# Patient Record
Sex: MALE | URBAN - METROPOLITAN AREA
[De-identification: names, ages, dates, MRNs, and addresses within clinical notes are randomized per-mention and may not be internally consistent; named-entity substitution may affect disease eponyms.]

---

## 2017-03-01 ENCOUNTER — APPOINTMENT (RX ONLY)
Dept: URBAN - METROPOLITAN AREA CLINIC 121 | Facility: CLINIC | Age: 62
Setting detail: DERMATOLOGY
End: 2017-03-01

## 2017-03-01 DIAGNOSIS — L30.9 DERMATITIS, UNSPECIFIED: ICD-10-CM

## 2017-03-01 DIAGNOSIS — B36.0 PITYRIASIS VERSICOLOR: ICD-10-CM

## 2017-03-01 PROBLEM — E03.9 HYPOTHYROIDISM, UNSPECIFIED: Status: ACTIVE | Noted: 2017-03-01

## 2017-03-01 PROBLEM — I63.50 CEREBRAL INFARCTION DUE TO UNSPECIFIED OCCLUSION OR STENOSIS OF UNSPECIFIED CEREBRAL ARTERY: Status: ACTIVE | Noted: 2017-03-01

## 2017-03-01 PROBLEM — M12.9 ARTHROPATHY, UNSPECIFIED: Status: ACTIVE | Noted: 2017-03-01

## 2017-03-01 PROBLEM — H91.90 UNSPECIFIED HEARING LOSS, UNSPECIFIED EAR: Status: ACTIVE | Noted: 2017-03-01

## 2017-03-01 PROBLEM — E78.5 HYPERLIPIDEMIA, UNSPECIFIED: Status: ACTIVE | Noted: 2017-03-01

## 2017-03-01 PROBLEM — K21.9 GASTRO-ESOPHAGEAL REFLUX DISEASE WITHOUT ESOPHAGITIS: Status: ACTIVE | Noted: 2017-03-01

## 2017-03-01 PROBLEM — L23.7 ALLERGIC CONTACT DERMATITIS DUE TO PLANTS, EXCEPT FOOD: Status: ACTIVE | Noted: 2017-03-01

## 2017-03-01 PROBLEM — I10 ESSENTIAL (PRIMARY) HYPERTENSION: Status: ACTIVE | Noted: 2017-03-01

## 2017-03-01 PROBLEM — F41.9 ANXIETY DISORDER, UNSPECIFIED: Status: ACTIVE | Noted: 2017-03-01

## 2017-03-01 PROBLEM — J30.1 ALLERGIC RHINITIS DUE TO POLLEN: Status: ACTIVE | Noted: 2017-03-01

## 2017-03-01 PROBLEM — I25.10 ATHEROSCLEROTIC HEART DISEASE OF NATIVE CORONARY ARTERY WITHOUT ANGINA PECTORIS: Status: ACTIVE | Noted: 2017-03-01

## 2017-03-01 PROCEDURE — ? PRESCRIPTION

## 2017-03-01 PROCEDURE — ? BIOPSY BY SHAVE METHOD

## 2017-03-01 PROCEDURE — ? COUNSELING

## 2017-03-01 PROCEDURE — 99202 OFFICE O/P NEW SF 15 MIN: CPT | Mod: 25

## 2017-03-01 PROCEDURE — 11100: CPT

## 2017-03-01 RX ORDER — ECONAZOLE NITRATE 10 MG/G
CREAM TOPICAL
Qty: 1 | Refills: 2 | Status: ERX | COMMUNITY
Start: 2017-03-01

## 2017-03-01 RX ADMIN — ECONAZOLE NITRATE: 10 CREAM TOPICAL at 17:18

## 2017-03-01 ASSESSMENT — PAIN INTENSITY VAS: HOW INTENSE IS YOUR PAIN 0 BEING NO PAIN, 10 BEING THE MOST SEVERE PAIN POSSIBLE?: NO PAIN

## 2017-03-01 ASSESSMENT — LOCATION ZONE DERM
LOCATION ZONE: ARM
LOCATION ZONE: TRUNK

## 2017-03-01 ASSESSMENT — BSA RASH: BSA RASH: 20

## 2017-03-01 ASSESSMENT — LOCATION SIMPLE DESCRIPTION DERM
LOCATION SIMPLE: CHEST
LOCATION SIMPLE: RIGHT UPPER BACK
LOCATION SIMPLE: RIGHT UPPER ARM

## 2017-03-01 ASSESSMENT — LOCATION DETAILED DESCRIPTION DERM
LOCATION DETAILED: RIGHT SUPERIOR MEDIAL UPPER BACK
LOCATION DETAILED: LEFT MEDIAL SUPERIOR CHEST
LOCATION DETAILED: RIGHT DISTAL POSTERIOR UPPER ARM

## 2017-03-01 ASSESSMENT — SEVERITY ASSESSMENT: SEVERITY: MILD

## 2017-03-01 NOTE — PROCEDURE: BIOPSY BY SHAVE METHOD
Additional Anesthesia Volume In Cc (Will Not Render If 0): 0
Electrodesiccation And Curettage Text: The wound bed was treated with electrodesiccation and curettage after the biopsy was performed.
Render Post-Care Instructions In Note?: yes
Biopsy Method: Personna blade
Silver Nitrate Text: The wound bed was treated with silver nitrate after the biopsy was performed.
Post-Care Instructions: -I reviewed with the patient in detail post-care instructions. Patient is to keep the pressure dressing dry and in place for 24 hours. Upon removing the bandage, the patient is to begin washing with soap and water and applying Vaseline, bacitracin, or polysporin (NOT neosporin) once to twice a day for 7-10 days or until healed. \\n-Bleeding is rare, but if it should occur apply direct pressure to the bleeding site for a full 15 minutes without easing up. If the bleeding continues despite your efforts, please call the office in which you were seen to speak with a provider. It may be necessary to go to the emergency room. \\n-your biopsy will be submitted to the appropriate lab for analysis and you should receive results within 7-10 business days. If the result is benign you may receive a card in the mail, otherwise we will contact you. \\n-if you have not heard from our office within 3 weeks either by phone or mail, please contact the office.
Cryotherapy Text: The wound bed was treated with cryotherapy after the biopsy was performed.
Hemostasis: Steven's
Biopsy Type: H and E
Electrodesiccation Text: The wound bed was treated with electrodesiccation after the biopsy was performed.
Lab: [facility] [address] [facility]
Lab Facility: [address]
Notification Instructions: Patient will be notified of biopsy results. However, patient instructed to call the office if not contacted within 2 weeks.
Destruction After The Procedure: No
Consent: Written consent was obtained and risks were reviewed including but not limited to scarring, infection, bleeding, scabbing, incomplete removal, nerve damage and allergy to anesthesia.
Anesthesia Type: 1% lidocaine with epinephrine and a 1:10 solution of 8.4% sodium bicarbonate
Detail Level: Detailed
Curettage Text: The wound bed was treated with curettage after the biopsy was performed.
Type Of Destruction Used: Curettage
Dressing: bandage
Billing Type: [employer]
Size Of Lesion In Cm: 0.7
Wound Care: Vaseline
Anesthesia Volume In Cc (Will Not Render If 0): 0.3

## 2022-07-09 ENCOUNTER — TELEPHONE ENCOUNTER (OUTPATIENT)
Dept: URBAN - METROPOLITAN AREA CLINIC 121 | Facility: CLINIC | Age: 67
End: 2022-07-09

## 2022-07-10 ENCOUNTER — TELEPHONE ENCOUNTER (OUTPATIENT)
Dept: URBAN - METROPOLITAN AREA CLINIC 121 | Facility: CLINIC | Age: 67
End: 2022-07-10

## 2024-02-07 ENCOUNTER — HOSPITAL ENCOUNTER (INPATIENT)
Facility: HOSPITAL | Age: 69
LOS: 4 days | Discharge: NURSING, REHAB OR LONG-TERM CARE | DRG: 689 | End: 2024-02-13
Attending: EMERGENCY MEDICINE | Admitting: INTERNAL MEDICINE
Payer: MEDICARE

## 2024-02-07 ENCOUNTER — APPOINTMENT (OUTPATIENT)
Dept: CARDIOLOGY | Facility: HOSPITAL | Age: 69
DRG: 689 | End: 2024-02-07
Payer: MEDICARE

## 2024-02-07 ENCOUNTER — APPOINTMENT (OUTPATIENT)
Dept: RADIOLOGY | Facility: HOSPITAL | Age: 69
DRG: 689 | End: 2024-02-07
Payer: MEDICARE

## 2024-02-07 DIAGNOSIS — N30.00 ACUTE CYSTITIS WITHOUT HEMATURIA: ICD-10-CM

## 2024-02-07 DIAGNOSIS — I10 HYPERTENSION, UNSPECIFIED TYPE: ICD-10-CM

## 2024-02-07 DIAGNOSIS — N30.01 ACUTE CYSTITIS WITH HEMATURIA: Primary | ICD-10-CM

## 2024-02-07 PROBLEM — N39.0 UTI (URINARY TRACT INFECTION): Status: ACTIVE | Noted: 2024-02-07

## 2024-02-07 LAB
ALBUMIN SERPL BCP-MCNC: 4.7 G/DL (ref 3.4–5)
ALP SERPL-CCNC: 50 U/L (ref 33–136)
ALT SERPL W P-5'-P-CCNC: 13 U/L (ref 10–52)
ANION GAP SERPL CALC-SCNC: 15 MMOL/L (ref 10–20)
APPEARANCE UR: CLEAR
AST SERPL W P-5'-P-CCNC: 31 U/L (ref 9–39)
BACTERIA #/AREA URNS AUTO: ABNORMAL /HPF
BASOPHILS # BLD AUTO: 0.05 X10*3/UL (ref 0–0.1)
BASOPHILS NFR BLD AUTO: 0.3 %
BILIRUB SERPL-MCNC: 1.4 MG/DL (ref 0–1.2)
BILIRUB UR STRIP.AUTO-MCNC: NEGATIVE MG/DL
BUN SERPL-MCNC: 15 MG/DL (ref 6–23)
CALCIUM SERPL-MCNC: 10.4 MG/DL (ref 8.6–10.3)
CARDIAC TROPONIN I PNL SERPL HS: 8 NG/L (ref 0–20)
CHLORIDE SERPL-SCNC: 101 MMOL/L (ref 98–107)
CO2 SERPL-SCNC: 25 MMOL/L (ref 21–32)
COLOR UR: YELLOW
CREAT SERPL-MCNC: 1.31 MG/DL (ref 0.5–1.3)
EGFRCR SERPLBLD CKD-EPI 2021: 59 ML/MIN/1.73M*2
EOSINOPHIL # BLD AUTO: 0.06 X10*3/UL (ref 0–0.7)
EOSINOPHIL NFR BLD AUTO: 0.4 %
ERYTHROCYTE [DISTWIDTH] IN BLOOD BY AUTOMATED COUNT: 12.9 % (ref 11.5–14.5)
FLUAV RNA RESP QL NAA+PROBE: NOT DETECTED
FLUBV RNA RESP QL NAA+PROBE: NOT DETECTED
GLUCOSE SERPL-MCNC: 108 MG/DL (ref 74–99)
GLUCOSE UR STRIP.AUTO-MCNC: NEGATIVE MG/DL
HCT VFR BLD AUTO: 47.3 % (ref 41–52)
HGB BLD-MCNC: 15.6 G/DL (ref 13.5–17.5)
HOLD SPECIMEN: NORMAL
IMM GRANULOCYTES # BLD AUTO: 0.12 X10*3/UL (ref 0–0.7)
IMM GRANULOCYTES NFR BLD AUTO: 0.7 % (ref 0–0.9)
KETONES UR STRIP.AUTO-MCNC: NEGATIVE MG/DL
LACTATE SERPL-SCNC: 1.4 MMOL/L (ref 0.4–2)
LEUKOCYTE ESTERASE UR QL STRIP.AUTO: ABNORMAL
LYMPHOCYTES # BLD AUTO: 0.93 X10*3/UL (ref 1.2–4.8)
LYMPHOCYTES NFR BLD AUTO: 5.7 %
MAGNESIUM SERPL-MCNC: 1.95 MG/DL (ref 1.6–2.4)
MCH RBC QN AUTO: 33.1 PG (ref 26–34)
MCHC RBC AUTO-ENTMCNC: 33 G/DL (ref 32–36)
MCV RBC AUTO: 100 FL (ref 80–100)
MONOCYTES # BLD AUTO: 0.71 X10*3/UL (ref 0.1–1)
MONOCYTES NFR BLD AUTO: 4.4 %
NEUTROPHILS # BLD AUTO: 14.32 X10*3/UL (ref 1.2–7.7)
NEUTROPHILS NFR BLD AUTO: 88.5 %
NITRITE UR QL STRIP.AUTO: NEGATIVE
NRBC BLD-RTO: 0 /100 WBCS (ref 0–0)
PH UR STRIP.AUTO: 7 [PH]
PLATELET # BLD AUTO: 115 X10*3/UL (ref 150–450)
POTASSIUM SERPL-SCNC: 4.9 MMOL/L (ref 3.5–5.3)
POTASSIUM SERPL-SCNC: 5.5 MMOL/L (ref 3.5–5.3)
PROT SERPL-MCNC: 7.1 G/DL (ref 6.4–8.2)
PROT UR STRIP.AUTO-MCNC: ABNORMAL MG/DL
RBC # BLD AUTO: 4.72 X10*6/UL (ref 4.5–5.9)
RBC # UR STRIP.AUTO: ABNORMAL /UL
RBC #/AREA URNS AUTO: ABNORMAL /HPF
SARS-COV-2 RNA RESP QL NAA+PROBE: NOT DETECTED
SODIUM SERPL-SCNC: 135 MMOL/L (ref 136–145)
SP GR UR STRIP.AUTO: 1.01
UROBILINOGEN UR STRIP.AUTO-MCNC: 2 MG/DL
WBC # BLD AUTO: 16.2 X10*3/UL (ref 4.4–11.3)
WBC #/AREA URNS AUTO: ABNORMAL /HPF
WBC CLUMPS #/AREA URNS AUTO: ABNORMAL /HPF

## 2024-02-07 PROCEDURE — G0378 HOSPITAL OBSERVATION PER HR: HCPCS

## 2024-02-07 PROCEDURE — 84132 ASSAY OF SERUM POTASSIUM: CPT | Performed by: PHYSICIAN ASSISTANT

## 2024-02-07 PROCEDURE — 84484 ASSAY OF TROPONIN QUANT: CPT | Performed by: PHYSICIAN ASSISTANT

## 2024-02-07 PROCEDURE — 36415 COLL VENOUS BLD VENIPUNCTURE: CPT | Performed by: PHYSICIAN ASSISTANT

## 2024-02-07 PROCEDURE — 97161 PT EVAL LOW COMPLEX 20 MIN: CPT | Mod: GP

## 2024-02-07 PROCEDURE — 83605 ASSAY OF LACTIC ACID: CPT | Performed by: PHYSICIAN ASSISTANT

## 2024-02-07 PROCEDURE — 2500000004 HC RX 250 GENERAL PHARMACY W/ HCPCS (ALT 636 FOR OP/ED): Performed by: NURSE PRACTITIONER

## 2024-02-07 PROCEDURE — 2500000004 HC RX 250 GENERAL PHARMACY W/ HCPCS (ALT 636 FOR OP/ED): Performed by: PHYSICIAN ASSISTANT

## 2024-02-07 PROCEDURE — 87636 SARSCOV2 & INF A&B AMP PRB: CPT | Performed by: PHYSICIAN ASSISTANT

## 2024-02-07 PROCEDURE — 71046 X-RAY EXAM CHEST 2 VIEWS: CPT | Mod: FR

## 2024-02-07 PROCEDURE — 83735 ASSAY OF MAGNESIUM: CPT | Performed by: PHYSICIAN ASSISTANT

## 2024-02-07 PROCEDURE — 99285 EMERGENCY DEPT VISIT HI MDM: CPT | Mod: 25 | Performed by: EMERGENCY MEDICINE

## 2024-02-07 PROCEDURE — 93005 ELECTROCARDIOGRAM TRACING: CPT

## 2024-02-07 PROCEDURE — 80053 COMPREHEN METABOLIC PANEL: CPT | Performed by: PHYSICIAN ASSISTANT

## 2024-02-07 PROCEDURE — 85025 COMPLETE CBC W/AUTO DIFF WBC: CPT | Performed by: PHYSICIAN ASSISTANT

## 2024-02-07 PROCEDURE — 71046 X-RAY EXAM CHEST 2 VIEWS: CPT | Mod: FOREIGN READ | Performed by: RADIOLOGY

## 2024-02-07 PROCEDURE — 87086 URINE CULTURE/COLONY COUNT: CPT | Mod: PARLAB | Performed by: PHYSICIAN ASSISTANT

## 2024-02-07 PROCEDURE — 2500000001 HC RX 250 WO HCPCS SELF ADMINISTERED DRUGS (ALT 637 FOR MEDICARE OP): Performed by: NURSE PRACTITIONER

## 2024-02-07 PROCEDURE — 81001 URINALYSIS AUTO W/SCOPE: CPT | Performed by: PHYSICIAN ASSISTANT

## 2024-02-07 RX ORDER — SENNOSIDES 8.6 MG/1
2 TABLET ORAL 2 TIMES DAILY
Status: DISCONTINUED | OUTPATIENT
Start: 2024-02-07 | End: 2024-02-13 | Stop reason: HOSPADM

## 2024-02-07 RX ORDER — CITALOPRAM 40 MG/1
40 TABLET, FILM COATED ORAL
COMMUNITY
Start: 2022-12-05

## 2024-02-07 RX ORDER — TAMSULOSIN HYDROCHLORIDE 0.4 MG/1
0.4 CAPSULE ORAL 2 TIMES DAILY
COMMUNITY
Start: 2023-11-09

## 2024-02-07 RX ORDER — FLUTICASONE PROPIONATE 50 MCG
2 SPRAY, SUSPENSION (ML) NASAL DAILY PRN
COMMUNITY
Start: 2018-04-02

## 2024-02-07 RX ORDER — PANTOPRAZOLE SODIUM 40 MG/1
40 TABLET, DELAYED RELEASE ORAL
Status: DISCONTINUED | OUTPATIENT
Start: 2024-02-07 | End: 2024-02-13 | Stop reason: HOSPADM

## 2024-02-07 RX ORDER — FAMOTIDINE 20 MG/1
20 TABLET, FILM COATED ORAL 2 TIMES DAILY
COMMUNITY
Start: 2021-07-08

## 2024-02-07 RX ORDER — SODIUM CHLORIDE 9 MG/ML
75 INJECTION, SOLUTION INTRAVENOUS CONTINUOUS
Status: ACTIVE | OUTPATIENT
Start: 2024-02-07 | End: 2024-02-07

## 2024-02-07 RX ORDER — ACETAMINOPHEN 325 MG/1
650 TABLET ORAL EVERY 6 HOURS PRN
Status: DISCONTINUED | OUTPATIENT
Start: 2024-02-07 | End: 2024-02-13 | Stop reason: HOSPADM

## 2024-02-07 RX ORDER — PANTOPRAZOLE SODIUM 40 MG/1
40 TABLET, DELAYED RELEASE ORAL
COMMUNITY
Start: 2023-05-10

## 2024-02-07 RX ORDER — CLOPIDOGREL BISULFATE 75 MG/1
75 TABLET ORAL DAILY
Status: DISCONTINUED | OUTPATIENT
Start: 2024-02-07 | End: 2024-02-13 | Stop reason: HOSPADM

## 2024-02-07 RX ORDER — LEVOTHYROXINE SODIUM 175 UG/1
175 TABLET ORAL
COMMUNITY
Start: 2023-10-16

## 2024-02-07 RX ORDER — ROSUVASTATIN CALCIUM 10 MG/1
20 TABLET, COATED ORAL DAILY
Status: DISCONTINUED | OUTPATIENT
Start: 2024-02-07 | End: 2024-02-13 | Stop reason: HOSPADM

## 2024-02-07 RX ORDER — LOSARTAN POTASSIUM 25 MG/1
1 TABLET ORAL NIGHTLY
COMMUNITY
Start: 2023-05-30 | End: 2024-02-13 | Stop reason: HOSPADM

## 2024-02-07 RX ORDER — CLOPIDOGREL BISULFATE 75 MG/1
75 TABLET ORAL
COMMUNITY
Start: 2023-09-01

## 2024-02-07 RX ORDER — CITALOPRAM 20 MG/1
40 TABLET, FILM COATED ORAL
Status: DISCONTINUED | OUTPATIENT
Start: 2024-02-07 | End: 2024-02-13 | Stop reason: HOSPADM

## 2024-02-07 RX ORDER — METOPROLOL SUCCINATE 25 MG/1
0.5 TABLET, EXTENDED RELEASE ORAL DAILY
COMMUNITY
Start: 2023-05-30

## 2024-02-07 RX ORDER — CEFTRIAXONE 1 G/50ML
1 INJECTION, SOLUTION INTRAVENOUS EVERY 24 HOURS
Status: DISCONTINUED | OUTPATIENT
Start: 2024-02-07 | End: 2024-02-09

## 2024-02-07 RX ORDER — NITROGLYCERIN 0.4 MG/1
0.4 TABLET SUBLINGUAL EVERY 5 MIN PRN
COMMUNITY
Start: 2022-10-14

## 2024-02-07 RX ORDER — ROSUVASTATIN CALCIUM 20 MG/1
20 TABLET, COATED ORAL
COMMUNITY
Start: 2023-11-08

## 2024-02-07 RX ORDER — METOPROLOL SUCCINATE 25 MG/1
12.5 TABLET, EXTENDED RELEASE ORAL DAILY
Status: DISCONTINUED | OUTPATIENT
Start: 2024-02-07 | End: 2024-02-13 | Stop reason: HOSPADM

## 2024-02-07 RX ORDER — FAMOTIDINE 20 MG/1
20 TABLET, FILM COATED ORAL 2 TIMES DAILY
Status: DISCONTINUED | OUTPATIENT
Start: 2024-02-07 | End: 2024-02-13 | Stop reason: HOSPADM

## 2024-02-07 RX ORDER — ALLOPURINOL 100 MG/1
200 TABLET ORAL
COMMUNITY
Start: 2023-12-11

## 2024-02-07 RX ORDER — TAMSULOSIN HYDROCHLORIDE 0.4 MG/1
0.4 CAPSULE ORAL 2 TIMES DAILY
Status: DISCONTINUED | OUTPATIENT
Start: 2024-02-07 | End: 2024-02-13 | Stop reason: HOSPADM

## 2024-02-07 RX ORDER — ACETAMINOPHEN 325 MG/1
975 TABLET ORAL ONCE
Status: COMPLETED | OUTPATIENT
Start: 2024-02-07 | End: 2024-02-07

## 2024-02-07 RX ORDER — CEFTRIAXONE 1 G/50ML
1 INJECTION, SOLUTION INTRAVENOUS ONCE
Status: COMPLETED | OUTPATIENT
Start: 2024-02-07 | End: 2024-02-07

## 2024-02-07 RX ORDER — LEVOTHYROXINE SODIUM 175 UG/1
175 TABLET ORAL
Status: DISCONTINUED | OUTPATIENT
Start: 2024-02-08 | End: 2024-02-13 | Stop reason: HOSPADM

## 2024-02-07 RX ORDER — LOSARTAN POTASSIUM 25 MG/1
25 TABLET ORAL NIGHTLY
Status: DISCONTINUED | OUTPATIENT
Start: 2024-02-07 | End: 2024-02-11

## 2024-02-07 RX ORDER — HEPARIN SODIUM 5000 [USP'U]/ML
5000 INJECTION, SOLUTION INTRAVENOUS; SUBCUTANEOUS EVERY 8 HOURS
Status: DISCONTINUED | OUTPATIENT
Start: 2024-02-07 | End: 2024-02-13 | Stop reason: HOSPADM

## 2024-02-07 RX ADMIN — METOPROLOL SUCCINATE 12.5 MG: 25 TABLET, EXTENDED RELEASE ORAL at 16:40

## 2024-02-07 RX ADMIN — TAMSULOSIN HYDROCHLORIDE 0.4 MG: 0.4 CAPSULE ORAL at 16:40

## 2024-02-07 RX ADMIN — HEPARIN SODIUM 5000 UNITS: 5000 INJECTION INTRAVENOUS; SUBCUTANEOUS at 16:39

## 2024-02-07 RX ADMIN — ACETAMINOPHEN 650 MG: 325 TABLET ORAL at 14:17

## 2024-02-07 RX ADMIN — PANTOPRAZOLE SODIUM 40 MG: 40 TABLET, DELAYED RELEASE ORAL at 16:39

## 2024-02-07 RX ADMIN — CLOPIDOGREL BISULFATE 75 MG: 75 TABLET ORAL at 16:39

## 2024-02-07 RX ADMIN — CITALOPRAM HYDROBROMIDE 40 MG: 20 TABLET ORAL at 16:40

## 2024-02-07 RX ADMIN — SENNOSIDES 17.2 MG: 8.6 TABLET, FILM COATED ORAL at 16:39

## 2024-02-07 RX ADMIN — CEFTRIAXONE SODIUM 1 G: 1 INJECTION, SOLUTION INTRAVENOUS at 11:02

## 2024-02-07 RX ADMIN — ROSUVASTATIN CALCIUM 20 MG: 10 TABLET, FILM COATED ORAL at 16:40

## 2024-02-07 RX ADMIN — LOSARTAN POTASSIUM 25 MG: 25 TABLET, FILM COATED ORAL at 20:40

## 2024-02-07 RX ADMIN — ACETAMINOPHEN 975 MG: 325 TABLET ORAL at 08:39

## 2024-02-07 RX ADMIN — FAMOTIDINE 20 MG: 20 TABLET ORAL at 16:39

## 2024-02-07 RX ADMIN — SODIUM CHLORIDE 75 ML/HR: 9 INJECTION, SOLUTION INTRAVENOUS at 16:37

## 2024-02-07 SDOH — SOCIAL STABILITY: SOCIAL INSECURITY: WERE YOU ABLE TO COMPLETE ALL THE BEHAVIORAL HEALTH SCREENINGS?: YES

## 2024-02-07 SDOH — SOCIAL STABILITY: SOCIAL INSECURITY: DO YOU FEEL UNSAFE GOING BACK TO THE PLACE WHERE YOU ARE LIVING?: NO

## 2024-02-07 SDOH — SOCIAL STABILITY: SOCIAL INSECURITY: HAVE YOU HAD THOUGHTS OF HARMING ANYONE ELSE?: NO

## 2024-02-07 SDOH — SOCIAL STABILITY: SOCIAL INSECURITY: DOES ANYONE TRY TO KEEP YOU FROM HAVING/CONTACTING OTHER FRIENDS OR DOING THINGS OUTSIDE YOUR HOME?: NO

## 2024-02-07 SDOH — SOCIAL STABILITY: SOCIAL INSECURITY: DO YOU FEEL ANYONE HAS EXPLOITED OR TAKEN ADVANTAGE OF YOU FINANCIALLY OR OF YOUR PERSONAL PROPERTY?: NO

## 2024-02-07 SDOH — SOCIAL STABILITY: SOCIAL INSECURITY: ARE YOU OR HAVE YOU BEEN THREATENED OR ABUSED PHYSICALLY, EMOTIONALLY, OR SEXUALLY BY ANYONE?: NO

## 2024-02-07 SDOH — SOCIAL STABILITY: SOCIAL INSECURITY: ABUSE: ADULT

## 2024-02-07 SDOH — SOCIAL STABILITY: SOCIAL INSECURITY: HAS ANYONE EVER THREATENED TO HURT YOUR FAMILY OR YOUR PETS?: NO

## 2024-02-07 SDOH — SOCIAL STABILITY: SOCIAL INSECURITY: ARE THERE ANY APPARENT SIGNS OF INJURIES/BEHAVIORS THAT COULD BE RELATED TO ABUSE/NEGLECT?: NO

## 2024-02-07 ASSESSMENT — COGNITIVE AND FUNCTIONAL STATUS - GENERAL
TOILETING: A LITTLE
MOBILITY SCORE: 13
WALKING IN HOSPITAL ROOM: A LOT
TURNING FROM BACK TO SIDE WHILE IN FLAT BAD: A LITTLE
DRESSING REGULAR LOWER BODY CLOTHING: A LITTLE
WALKING IN HOSPITAL ROOM: A LOT
MOVING TO AND FROM BED TO CHAIR: A LOT
MOBILITY SCORE: 13
PATIENT BASELINE BEDBOUND: NO
DAILY ACTIVITIY SCORE: 21
MOVING TO AND FROM BED TO CHAIR: A LOT
DRESSING REGULAR LOWER BODY CLOTHING: A LITTLE
TURNING FROM BACK TO SIDE WHILE IN FLAT BAD: A LOT
STANDING UP FROM CHAIR USING ARMS: A LOT
HELP NEEDED FOR BATHING: A LITTLE
STANDING UP FROM CHAIR USING ARMS: A LITTLE
STANDING UP FROM CHAIR USING ARMS: A LOT
TURNING FROM BACK TO SIDE WHILE IN FLAT BAD: A LOT
MOVING TO AND FROM BED TO CHAIR: A LITTLE
TOILETING: A LITTLE
CLIMB 3 TO 5 STEPS WITH RAILING: TOTAL
DAILY ACTIVITIY SCORE: 21
WALKING IN HOSPITAL ROOM: A LOT
HELP NEEDED FOR BATHING: A LITTLE
CLIMB 3 TO 5 STEPS WITH RAILING: TOTAL

## 2024-02-07 ASSESSMENT — PAIN - FUNCTIONAL ASSESSMENT
PAIN_FUNCTIONAL_ASSESSMENT: 0-10
PAIN_FUNCTIONAL_ASSESSMENT: 0-10

## 2024-02-07 ASSESSMENT — ACTIVITIES OF DAILY LIVING (ADL)
HEARING - LEFT EAR: FUNCTIONAL
TOILETING: NEEDS ASSISTANCE
PATIENT'S MEMORY ADEQUATE TO SAFELY COMPLETE DAILY ACTIVITIES?: NO
GROOMING: INDEPENDENT
LACK_OF_TRANSPORTATION: NO
ADEQUATE_TO_COMPLETE_ADL: YES
WALKS IN HOME: NEEDS ASSISTANCE
DRESSING YOURSELF: INDEPENDENT
HEARING - RIGHT EAR: FUNCTIONAL
FEEDING YOURSELF: INDEPENDENT
JUDGMENT_ADEQUATE_SAFELY_COMPLETE_DAILY_ACTIVITIES: NO
BATHING: NEEDS ASSISTANCE

## 2024-02-07 ASSESSMENT — LIFESTYLE VARIABLES
HOW OFTEN DURING THE LAST YEAR HAVE YOU FOUND THAT YOU WERE NOT ABLE TO STOP DRINKING ONCE YOU HAD STARTED: NEVER
HOW OFTEN DURING THE LAST YEAR HAVE YOU BEEN UNABLE TO REMEMBER WHAT HAPPENED THE NIGHT BEFORE BECAUSE YOU HAD BEEN DRINKING: NEVER
PRESCIPTION_ABUSE_PAST_12_MONTHS: NO
HOW OFTEN DURING THE LAST YEAR HAVE YOU NEEDED AN ALCOHOLIC DRINK FIRST THING IN THE MORNING TO GET YOURSELF GOING AFTER A NIGHT OF HEAVY DRINKING: LESS THAN MONTHLY
AUDIT-C TOTAL SCORE: 7
HOW MANY STANDARD DRINKS CONTAINING ALCOHOL DO YOU HAVE ON A TYPICAL DAY: 3 OR 4
HOW OFTEN DURING THE LAST YEAR HAVE YOU HAD A FEELING OF GUILT OR REMORSE AFTER DRINKING: NEVER
HOW OFTEN DO YOU HAVE A DRINK CONTAINING ALCOHOL: 4 OR MORE TIMES A WEEK
HAS A RELATIVE, FRIEND, DOCTOR, OR ANOTHER HEALTH PROFESSIONAL EXPRESSED CONCERN ABOUT YOUR DRINKING OR SUGGESTED YOU CUT DOWN: YES, BUT NOT IN THE LAST YEAR
SKIP TO QUESTIONS 9-10: 0
HOW OFTEN DURING THE LAST YEAR HAVE YOU FAILED TO DO WHAT WAS NORMALLY EXPECTED FROM YOU BECAUSE OF DRINKING: NEVER
AUDIT TOTAL SCORE: 10
SUBSTANCE_ABUSE_PAST_12_MONTHS: NO
HOW OFTEN DO YOU HAVE 6 OR MORE DRINKS ON ONE OCCASION: MONTHLY
HAVE YOU OR SOMEONE ELSE BEEN INJURED AS A RESULT OF YOUR DRINKING: NO
AUDIT TOTAL SCORE: 3
AUDIT-C TOTAL SCORE: 7

## 2024-02-07 ASSESSMENT — COLUMBIA-SUICIDE SEVERITY RATING SCALE - C-SSRS
1. IN THE PAST MONTH, HAVE YOU WISHED YOU WERE DEAD OR WISHED YOU COULD GO TO SLEEP AND NOT WAKE UP?: NO
6. HAVE YOU EVER DONE ANYTHING, STARTED TO DO ANYTHING, OR PREPARED TO DO ANYTHING TO END YOUR LIFE?: NO
6. HAVE YOU EVER DONE ANYTHING, STARTED TO DO ANYTHING, OR PREPARED TO DO ANYTHING TO END YOUR LIFE?: NO
2. HAVE YOU ACTUALLY HAD ANY THOUGHTS OF KILLING YOURSELF?: NO
1. IN THE PAST MONTH, HAVE YOU WISHED YOU WERE DEAD OR WISHED YOU COULD GO TO SLEEP AND NOT WAKE UP?: NO
2. HAVE YOU ACTUALLY HAD ANY THOUGHTS OF KILLING YOURSELF?: NO

## 2024-02-07 ASSESSMENT — PATIENT HEALTH QUESTIONNAIRE - PHQ9
2. FEELING DOWN, DEPRESSED OR HOPELESS: NOT AT ALL
1. LITTLE INTEREST OR PLEASURE IN DOING THINGS: NOT AT ALL
SUM OF ALL RESPONSES TO PHQ9 QUESTIONS 1 & 2: 0

## 2024-02-07 ASSESSMENT — PAIN SCALES - GENERAL
PAINLEVEL_OUTOF10: 0 - NO PAIN

## 2024-02-07 NOTE — ED PROVIDER NOTES
Limitations to History: confusion  External Records Reviewed  Independent Historians: nursing staff, patient's daughter  Social determinants affecting care: none    HPI  Aldo Rayo is a 68 y.o. male who presents emergency department for assessment of increased confusion and urinary frequency.  He is alert and oriented x 2 which makes him a poor historian.  He currently has no complaints.  Nursing staff reports that he is visiting from [place].  His daughter noticed that he was more confused and had some urinary frequency.  He has a history of UTIs per EMS.  PMH  Past Medical History:   Diagnosis Date    Anxiety     BPH (benign prostatic hyperplasia)     CAD (coronary artery disease)     CKD (chronic kidney disease)     Erectile dysfunction     GI bleed     Gout     HTN (hypertension)     Hypothyroidism     Stroke (cerebrum) (CMS/[facility])     reviewed by myself.    Meds  No current outpatient medications    Allergies  Allergies   Allergen Reactions    Aspirin GI bleeding    Cat Dander Itching and Swelling    Onion Headache     Headaches    Penicillins Rash    reviewed by myself.    SHx  Social History     Tobacco Use    Smoking status: Former     Types: Cigarettes, Pipe, Cigars     Quit date:      Years since quittin.1    Smokeless tobacco: Never   Substance Use Topics    Alcohol use: Yes     Comment: occasional    reviewed by myself.      ------------------------------------------------------------------------------------------------------------------------------------------    BP (!) 179/96 (BP Location: Left arm, Patient Position: Sitting)   Pulse 71   Temp (!) 38.2 °C (100.8 °F) (Temporal)   Resp 16   Wt 72.6 kg (160 lb)   SpO2 96%     Physical Exam  Vitals and nursing note reviewed.   Constitutional:       General: He is not in acute distress.     Appearance: Normal appearance. He is normal weight. He is not ill-appearing or toxic-appearing.   HENT:      Head: Normocephalic.      Nose: Nose  normal.      Mouth/Throat:      Mouth: Mucous membranes are moist.   Eyes:      Extraocular Movements: Extraocular movements intact.      Conjunctiva/sclera: Conjunctivae normal.   Cardiovascular:      Rate and Rhythm: Normal rate and regular rhythm.   Pulmonary:      Effort: Pulmonary effort is normal.      Breath sounds: Normal breath sounds.   Abdominal:      General: Abdomen is flat.      Palpations: Abdomen is soft.      Tenderness: There is no abdominal tenderness.   Musculoskeletal:         General: Normal range of motion.      Cervical back: Neck supple.   Skin:     General: Skin is warm and dry.   Neurological:      General: No focal deficit present.      Mental Status: He is alert.      Cranial Nerves: Cranial nerves 2-12 are intact.      Sensory: Sensation is intact.      Motor: Motor function is intact.      Comments: Oriented x 2   Psychiatric:         Attention and Perception: Attention normal.         Mood and Affect: Mood normal.          ------------------------------------------------------------------------------------------------------------------------------------------  Imaging  XR chest 2 views   Final Result   No regions of airspace consolidation.   Prior cardiac surgery and cardiomegaly.   Signed by Natalio Rueda MD           Labs  Labs Reviewed   CBC WITH AUTO DIFFERENTIAL - Abnormal       Result Value    WBC 16.2 (*)     nRBC 0.0      RBC 4.72      Hemoglobin 15.6      Hematocrit 47.3            MCH 33.1      MCHC 33.0      RDW 12.9      Platelets 115 (*)     Neutrophils % 88.5      Immature Granulocytes %, Automated 0.7      Lymphocytes % 5.7      Monocytes % 4.4      Eosinophils % 0.4      Basophils % 0.3      Neutrophils Absolute 14.32 (*)     Immature Granulocytes Absolute, Automated 0.12      Lymphocytes Absolute 0.93 (*)     Monocytes Absolute 0.71      Eosinophils Absolute 0.06      Basophils Absolute 0.05     COMPREHENSIVE METABOLIC PANEL - Abnormal    Glucose 108 (*)      Sodium 135 (*)     Potassium 5.5 (*)     Chloride 101      Bicarbonate 25      Anion Gap 15      Urea Nitrogen 15      Creatinine 1.31 (*)     eGFR 59 (*)     Calcium 10.4 (*)     Albumin 4.7      Alkaline Phosphatase 50      Total Protein 7.1      AST 31      Bilirubin, Total 1.4 (*)     ALT 13     URINALYSIS WITH REFLEX CULTURE AND MICROSCOPIC - Abnormal    Color, Urine Yellow      Appearance, Urine Clear      Specific Gravity, Urine 1.015      pH, Urine 7.0      Protein, Urine 30 (1+) (*)     Glucose, Urine NEGATIVE      Blood, Urine SMALL (1+) (*)     Ketones, Urine NEGATIVE      Bilirubin, Urine NEGATIVE      Urobilinogen, Urine 2.0 (*)     Nitrite, Urine NEGATIVE      Leukocyte Esterase, Urine MODERATE (2+) (*)    MICROSCOPIC ONLY, URINE - Abnormal    WBC, Urine 21-50 (*)     WBC Clumps, Urine RARE      RBC, Urine 6-10 (*)     Bacteria, Urine 2+ (*)    MAGNESIUM - Normal    Magnesium 1.95     LACTATE - Normal    Lactate 1.4      Narrative:     Venipuncture immediately after or during the administration of Metamizole may lead to falsely low results. Testing should be performed immediately  prior to Metamizole dosing.   SARS-COV-2 AND INFLUENZA A/B PCR - Normal    Flu A Result Not Detected      Flu B Result Not Detected      Coronavirus 2019, PCR Not Detected      Narrative:     This assay has received FDA Emergency Use Authorization (EUA) and  is only authorized for the duration of time that circumstances exist to justify the authorization of the emergency use of in vitro diagnostic tests for the detection of SARS-CoV-2 virus and/or diagnosis of COVID-19 infection under section 564(b)(1) of the Act, 21 U.S.C. 360bbb-3(b)(1). Testing for SARS-CoV-2 is only recommended for patients who meet current clinical and/or epidemiological criteria as defined by federal, state, or local public health directives. This assay is an in vitro diagnostic nucleic acid amplification test for the qualitative detection of  SARS-CoV-2, Influenza A, and Influenza B from nasopharyngeal specimens and has been validated for use at [facility]. Negative results do not preclude COVID-19 infections or Influenza A/B infections, and should not be used as the sole basis for diagnosis, treatment, or other management decisions. If Influenza A/B and RSV PCR results are negative, testing for Parainfluenza virus, Adenovirus and Metapneumovirus is routinely performed for [facility] [place] [place] pediatric oncology and intensive care inpatients, and is available on other patients by placing an add-on request.    TROPONIN I, HIGH SENSITIVITY - Normal    Troponin I, High Sensitivity 8      Narrative:     Less than 99th percentile of normal range cutoff-  Female and children under 18 years old <14 ng/L; Male <21 ng/L: Negative  Repeat testing should be performed if clinically indicated.     Female and children under 18 years old 14-50 ng/L; Male 21-50 ng/L:  Consistent with possible cardiac damage and possible increased clinical   risk. Serial measurements may help to assess extent of myocardial damage.     >50 ng/L: Consistent with cardiac damage, increased clinical risk and  myocardial infarction. Serial measurements may help assess extent of   myocardial damage.      NOTE: Children less than 1 year old may have higher baseline troponin   levels and results should be interpreted in conjunction with the overall   clinical context.     NOTE: Troponin I testing is performed using a different   testing methodology at [facility] than at other   [facility]. Direct result comparisons should only   be made within the same method.   POTASSIUM - Normal    Potassium 4.9     URINE CULTURE   URINALYSIS WITH REFLEX CULTURE AND MICROSCOPIC    Narrative:     The following orders were created for panel order Urinalysis with Reflex Culture and Microscopic.  Procedure                               Abnormality         Status                      ---------                               -----------         ------                     Urinalysis with Reflex C...[[id number]]  Abnormal            Final result               Extra Urine Gray Tube[[id number]]                            In process                   Please view results for these tests on the individual orders.   EXTRA URINE GRAY TUBE        ED Course  Diagnoses as of 02/07/24 1218   Acute cystitis with hematuria        Medical Decision Making: He did not appear ill or toxic.  Vital signs reviewed.  He is febrile.  He is hypertensive.  He is otherwise hemodynamically stable.  Comprehensive workup was initiated.    Differential diagnoses considered: COVID, influenza, pneumonia, UTI, others    Medications given: Oral Tylenol    EKG interpreted by myself: Normal sinus rhythm.  Ventricular rate 77 bpm.  No acute ST elevations or depressions.    Patient's daughter and wife now at bedside at 0810.  The patient's wife reports that he is never had a UTI before.  This would be the first time.  Patient daughter reports that he was up about every 15 minutes to urinate last night.  This morning he had a hard time getting up from bed and walking to the bathroom.  He does have history of coronary artery disease, hypertension, hyperlipidemia, CVA.  Patient daughter reports that he felt warm this morning she was concerned about possible fever.    I reviewed the labs from today.  Leukocytosis at 16.2.  H&H stable.  Platelets 115.  Glucose 108.  Sodium 135.  Potassium 5.5 however hemolyzed.  BUN 15 with creatinine 1.31.  Urinalysis positive for UTI.  Lactic normal.  COVID influenza negative.  Troponin negative.  Urine culture is pending.  Repeat potassium ordered due to hyperkalemia with hemolysis.  Chest x-ray showing no acute cardiopulmonary process.  He was ordered IV Rocephin for the UTI.  Due to his weakness, I do recommend admission.  The patient and his wife were updated and agreeable to plan of care.  I  consulted general medicine on-call.  I spoke with Dr. Benites who will admit.  Case discussed and evaluated with ED attending, Dr. Gilbert who is agreeable to patient plan of care.    Repeat potassium is normal at 4.9.    Diagnosis: UTI, generalized weakness  Plan: Admit           Mynor Palm PA-C  02/07/24 6433

## 2024-02-07 NOTE — PROGRESS NOTES
Physical Therapy    Physical Therapy    Physical Therapy Evaluation    Patient Name: Aldo Rayo  MRN: [id number]  Today's Date: 2/7/2024   Time Calculation  Start Time: 1535  Stop Time: 1600  Time Calculation (min): 25 min    Assessment/Plan   PT Assessment  PT Assessment Results: Decreased strength, Decreased endurance, Impaired balance, Decreased mobility, Decreased coordination, Decreased cognition  End of Session Communication: Bedside nurse  End of Session Patient Position: Bed, 4 rail up, Alarm on  IP OR SWING BED PT PLAN  Inpatient or Swing Bed: Inpatient  PT Plan  Treatment/Interventions: Bed mobility, Transfer training, Gait training, Strengthening  PT Plan: Skilled PT  PT Frequency: 3 times per week  PT Discharge Recommendations: Moderate intensity level of continued care  PT - OK to Discharge: Yes    Subjective     Current Problem:  Patient Active Problem List   Diagnosis    UTI (urinary tract infection)       General Visit Information:  General  Reason for Referral: confusion, urinary freq, ox2, cystitis w/ hematuria, htn, uti, gen weakness, metabolic encephalopathy  Past Medical History Relevant to Rehab: anxiety, bph, cad, ckd, gib, gout, htn, hypothyroid, stroke x2, cardiomegaly, tia x2, bowel perforation, cabg  Prior to Session Communication: Bedside nurse  Patient Position Received: Bed, 4 rail up, Alarm on    Home Living:       Prior Level of Function:       Precautions:  Precautions  Precautions Comment:  (falls)    Vital Signs:     Objective     Pain:  Pain Assessment  Pain Score: 0 - No pain    Cognition:  Cognition  Overall Cognitive Status:  (ox3 cues for year,difficulty following step 1commands, difficulty expressing words, requires repeated instuctions and demonstrations to complete tasks of pt eval, pt unable to provide pta info re; fxal mob, indep vs assist, home set up,no family present)    General Assessments:                  Coordination  Coordination Comment:  (impaired b toe tap)              Functional Assessments:     Bed Mobility  Bed Mobility:  (mod/min of 1)  Transfers  Transfer:  (mod/min of 1)  Ambulation/Gait Training  Ambulation/Gait Training Performed:  (mod/min of 1 hha to side step along cart. unsteady, high fall risk, trial use of sc did not improve gait)          Extremity/Trunk Assessments:        RLE   RLE :  (wfl)  LLE   LLE : Within Functional Limits    Outcome Measures:  [facility] Basic Mobility  Turning from your back to your side while in a flat bed without using bedrails: None  Moving from lying on your back to sitting on the side of a flat bed without using bedrails: A lot  Moving to and from bed to chair (including a wheelchair): A lot  Standing up from a chair using your arms (e.g. wheelchair or bedside chair): A lot  To walk in hospital room: A lot  Climbing 3-5 steps with railing: Total  Basic Mobility - Total Score: 13                            Goals:  Encounter Problems       Encounter Problems (Active)       PT Problem       PT Goal 1 (Progressing)       Start:  02/07/24    Expected End:  02/21/24       Indep bed mob          PT Goal 2 (Progressing)       Start:  02/07/24    Expected End:  02/21/24       Sba txs          PT Goal 3 (Progressing)       Start:  02/07/24    Expected End:  02/21/24       Sba amb w/ lad 15oft x4         PT Goal 4 (Progressing)       Start:  02/07/24    Expected End:  02/21/24       20-30 reps ble there ex              Education Documentation  Mobility Training, taught by Nury Trujillo, PT at 2/7/2024  4:20 PM.  Learner: Patient  Readiness: Acceptance  Method: Explanation  Response: Verbalizes Understanding    Education Comments  No comments found.

## 2024-02-07 NOTE — H&P
"History Of Present Illness  Aldo Rayo is a 68 y.o. male who presented with increased confusion and urinary frequency.  Dtr, who is a nurse, is at bedside assists with HPI. He has a hx of stroke x2 and TIA x2. Whenever he gets sick he has \"stroke-like symptoms\" (confusion). He is visiting from [place] and staying with his daughter, who noticed last night that he was more confused and had some urinary frequency and urgency, as well as falls. She and he deny prior hx of UTIs. This is despite recent decrease in fluid intake, as he didn't bring his daily water bottle with him as he was visiting around [facility].  ED workup was notable for fever of 38.2 C, WBC 16.2, creatinine 1.31 (pt and dtr unaware of baseline), BP elevated at 179/96. UA was positive for UTI. Lactic and respiratory screens were normal. He was given Rocephin. Remainder of ROS reviewed and negative except as indicated in HPI. He is unsure whether or not he wants to be resuscitated in the event of cardiac or respiratory arrest.    Past Medical History  He has a past medical history of Anxiety, Bowel perforation (CMS/[facility]), BPH (benign prostatic hyperplasia), CAD (coronary artery disease), CKD (chronic kidney disease), Erectile dysfunction, Gout, HTN (hypertension), Hypothyroidism, and Stroke (cerebrum) (CMS/[facility]) ().    Surgical History  He has a past surgical history that includes Coronary artery bypass graft () and Cardiac catheterization ().    Social History     Tobacco Use    Smoking status: Former     Types: Cigarettes, Pipe, Cigars     Quit date:      Years since quittin.1    Smokeless tobacco: Never   Substance Use Topics    Alcohol use: Yes     Comment: occasional       Family History  Family History   Problem Relation Name Age of Onset    Colon cancer Mother      Alzheimer's disease Mother      Thyroid disease Mother         Allergies  Aspirin, Cat dander, Onion, and Penicillins    Vitals:    24 0736   BP: (!) 179/96 "   Pulse: 71   Resp: 16   Temp: (!) 38.2 °C (100.8 °F)   SpO2: 96%       Vitals:    02/07/24 0736   Weight: 72.6 kg (160 lb)       Scheduled medications    Continuous medications    PRN medications      Results for orders placed or performed during the hospital encounter of 02/07/24 (from the past 24 hour(s))   CBC and Auto Differential   Result Value Ref Range    WBC 16.2 (H) 4.4 - 11.3 x10*3/uL    nRBC 0.0 0.0 - 0.0 /100 WBCs    RBC 4.72 4.50 - 5.90 x10*6/uL    Hemoglobin 15.6 13.5 - 17.5 g/dL    Hematocrit 47.3 41.0 - 52.0 %     80 - 100 fL    MCH 33.1 26.0 - 34.0 pg    MCHC 33.0 32.0 - 36.0 g/dL    RDW 12.9 11.5 - 14.5 %    Platelets 115 (L) 150 - 450 x10*3/uL    Neutrophils % 88.5 40.0 - 80.0 %    Immature Granulocytes %, Automated 0.7 0.0 - 0.9 %    Lymphocytes % 5.7 13.0 - 44.0 %    Monocytes % 4.4 2.0 - 10.0 %    Eosinophils % 0.4 0.0 - 6.0 %    Basophils % 0.3 0.0 - 2.0 %    Neutrophils Absolute 14.32 (H) 1.20 - 7.70 x10*3/uL    Immature Granulocytes Absolute, Automated 0.12 0.00 - 0.70 x10*3/uL    Lymphocytes Absolute 0.93 (L) 1.20 - 4.80 x10*3/uL    Monocytes Absolute 0.71 0.10 - 1.00 x10*3/uL    Eosinophils Absolute 0.06 0.00 - 0.70 x10*3/uL    Basophils Absolute 0.05 0.00 - 0.10 x10*3/uL   Magnesium   Result Value Ref Range    Magnesium 1.95 1.60 - 2.40 mg/dL   Comprehensive metabolic panel   Result Value Ref Range    Glucose 108 (H) 74 - 99 mg/dL    Sodium 135 (L) 136 - 145 mmol/L    Potassium 5.5 (H) 3.5 - 5.3 mmol/L    Chloride 101 98 - 107 mmol/L    Bicarbonate 25 21 - 32 mmol/L    Anion Gap 15 10 - 20 mmol/L    Urea Nitrogen 15 6 - 23 mg/dL    Creatinine 1.31 (H) 0.50 - 1.30 mg/dL    eGFR 59 (L) >60 mL/min/1.73m*2    Calcium 10.4 (H) 8.6 - 10.3 mg/dL    Albumin 4.7 3.4 - 5.0 g/dL    Alkaline Phosphatase 50 33 - 136 U/L    Total Protein 7.1 6.4 - 8.2 g/dL    AST 31 9 - 39 U/L    Bilirubin, Total 1.4 (H) 0.0 - 1.2 mg/dL    ALT 13 10 - 52 U/L   Lactate   Result Value Ref Range    Lactate 1.4 0.4  - 2.0 mmol/L   Sars-CoV-2 and Influenza A/B PCR   Result Value Ref Range    Flu A Result Not Detected Not Detected    Flu B Result Not Detected Not Detected    Coronavirus 2019, PCR Not Detected Not Detected   Urinalysis with Reflex Culture and Microscopic   Result Value Ref Range    Color, Urine Yellow Straw, Yellow    Appearance, Urine Clear Clear    Specific Gravity, Urine 1.015 1.005 - 1.035    pH, Urine 7.0 5.0, 5.5, 6.0, 6.5, 7.0, 7.5, 8.0    Protein, Urine 30 (1+) (N) NEGATIVE mg/dL    Glucose, Urine NEGATIVE NEGATIVE mg/dL    Blood, Urine SMALL (1+) (A) NEGATIVE    Ketones, Urine NEGATIVE NEGATIVE mg/dL    Bilirubin, Urine NEGATIVE NEGATIVE    Urobilinogen, Urine 2.0 (N) <2.0 mg/dL    Nitrite, Urine NEGATIVE NEGATIVE    Leukocyte Esterase, Urine MODERATE (2+) (A) NEGATIVE   Microscopic Only, Urine   Result Value Ref Range    WBC, Urine 21-50 (A) 1-5, NONE /HPF    WBC Clumps, Urine RARE Reference range not established. /HPF    RBC, Urine 6-10 (A) NONE, 1-2, 3-5 /HPF    Bacteria, Urine 2+ (A) NONE SEEN /HPF   Troponin I, High Sensitivity   Result Value Ref Range    Troponin I, High Sensitivity 8 0 - 20 ng/L   Potassium   Result Value Ref Range    Potassium 4.9 3.5 - 5.3 mmol/L       Constitutional: Well developed, awake, alert, oriented x3, no acute distress, cooperative   Eyes: EOMI, clear sclera   ENMT: mucous membranes moist, no lesions seen   Head/Neck: Neck supple, no apparent injury, head atraumatic   Respiratory/Thorax: CTAB, good chest expansion, respirations even and unlabored   Cardiovascular: Regular rate and rhythm, no murmurs/rubs/gallops, normal S1 and S 2   Gastrointestinal: Abdomen nondistended, soft, nontender, hypoactive BS, no bruits   Musculoskeletal: ROM intact, no joint swelling, normal  strength   Extremities: no cyanosis, edema, contusions or clubbing   Neurological: no focal deficit, pt alert and oriented x3   Psychological: Appropriate affect and behavior, pleasant   Skin: Warm and  "dry, no lesions, no rashes       Assessment/Plan  UTI      - day #1 Rocephin pending urine C+S, monitor fevers and leukocytosis  Metabolic encphalopathy     - no hx dementia, per family pt gets confused with infections       - supportive care, should resolve in tandem with ATB treatment   Hx of stroke x2 and TIA x2  CAD s/p CABG x3     - continue Plavix, metoprolol and statin     - pt off ASA and on \"prophylactic\" PPI for hx of GIB, which dtr says was a bowel perf during colonoscopy  Hx CKD      - appears to be at baseline (~1.3-1.4 per EMR)     - recent decreased fluid intake per dtr, start IVNS and follow BMP  HTN, uncontrolled     - monitor on home meds   DVT ppx with subcutaneous heparin  Discharge disposition     - pending PT/OT lenora Pryor, CNP  Hospital Medicine    "

## 2024-02-08 LAB
ANION GAP SERPL CALC-SCNC: 15 MMOL/L (ref 10–20)
BUN SERPL-MCNC: 13 MG/DL (ref 6–23)
CALCIUM SERPL-MCNC: 9.5 MG/DL (ref 8.6–10.3)
CHLORIDE SERPL-SCNC: 104 MMOL/L (ref 98–107)
CO2 SERPL-SCNC: 21 MMOL/L (ref 21–32)
CREAT SERPL-MCNC: 1.23 MG/DL (ref 0.5–1.3)
EGFRCR SERPLBLD CKD-EPI 2021: 63 ML/MIN/1.73M*2
ERYTHROCYTE [DISTWIDTH] IN BLOOD BY AUTOMATED COUNT: 12.8 % (ref 11.5–14.5)
GLUCOSE SERPL-MCNC: 87 MG/DL (ref 74–99)
HCT VFR BLD AUTO: 40.2 % (ref 41–52)
HGB BLD-MCNC: 13.5 G/DL (ref 13.5–17.5)
MCH RBC QN AUTO: 32.7 PG (ref 26–34)
MCHC RBC AUTO-ENTMCNC: 33.6 G/DL (ref 32–36)
MCV RBC AUTO: 97 FL (ref 80–100)
NRBC BLD-RTO: 0 /100 WBCS (ref 0–0)
PLATELET # BLD AUTO: 118 X10*3/UL (ref 150–450)
POTASSIUM SERPL-SCNC: 3.7 MMOL/L (ref 3.5–5.3)
RBC # BLD AUTO: 4.13 X10*6/UL (ref 4.5–5.9)
SODIUM SERPL-SCNC: 136 MMOL/L (ref 136–145)
WBC # BLD AUTO: 18.9 X10*3/UL (ref 4.4–11.3)

## 2024-02-08 PROCEDURE — 97165 OT EVAL LOW COMPLEX 30 MIN: CPT | Mod: GO

## 2024-02-08 PROCEDURE — 36415 COLL VENOUS BLD VENIPUNCTURE: CPT | Performed by: NURSE PRACTITIONER

## 2024-02-08 PROCEDURE — 2500000001 HC RX 250 WO HCPCS SELF ADMINISTERED DRUGS (ALT 637 FOR MEDICARE OP): Performed by: NURSE PRACTITIONER

## 2024-02-08 PROCEDURE — 97535 SELF CARE MNGMENT TRAINING: CPT | Mod: GP

## 2024-02-08 PROCEDURE — G0378 HOSPITAL OBSERVATION PER HR: HCPCS

## 2024-02-08 PROCEDURE — 80048 BASIC METABOLIC PNL TOTAL CA: CPT | Performed by: NURSE PRACTITIONER

## 2024-02-08 PROCEDURE — 85027 COMPLETE CBC AUTOMATED: CPT | Performed by: NURSE PRACTITIONER

## 2024-02-08 PROCEDURE — 2500000004 HC RX 250 GENERAL PHARMACY W/ HCPCS (ALT 636 FOR OP/ED): Performed by: NURSE PRACTITIONER

## 2024-02-08 RX ADMIN — FAMOTIDINE 20 MG: 20 TABLET ORAL at 20:10

## 2024-02-08 RX ADMIN — METOPROLOL SUCCINATE 12.5 MG: 25 TABLET, EXTENDED RELEASE ORAL at 09:13

## 2024-02-08 RX ADMIN — FAMOTIDINE 20 MG: 20 TABLET ORAL at 09:13

## 2024-02-08 RX ADMIN — LEVOTHYROXINE SODIUM 175 MCG: 175 TABLET ORAL at 06:01

## 2024-02-08 RX ADMIN — HEPARIN SODIUM 5000 UNITS: 5000 INJECTION INTRAVENOUS; SUBCUTANEOUS at 15:45

## 2024-02-08 RX ADMIN — CLOPIDOGREL BISULFATE 75 MG: 75 TABLET ORAL at 09:13

## 2024-02-08 RX ADMIN — ACETAMINOPHEN 650 MG: 325 TABLET ORAL at 17:32

## 2024-02-08 RX ADMIN — ACETAMINOPHEN 650 MG: 325 TABLET ORAL at 09:13

## 2024-02-08 RX ADMIN — HEPARIN SODIUM 5000 UNITS: 5000 INJECTION INTRAVENOUS; SUBCUTANEOUS at 09:18

## 2024-02-08 RX ADMIN — HEPARIN SODIUM 5000 UNITS: 5000 INJECTION INTRAVENOUS; SUBCUTANEOUS at 00:10

## 2024-02-08 RX ADMIN — TAMSULOSIN HYDROCHLORIDE 0.4 MG: 0.4 CAPSULE ORAL at 09:12

## 2024-02-08 RX ADMIN — PANTOPRAZOLE SODIUM 40 MG: 40 TABLET, DELAYED RELEASE ORAL at 06:01

## 2024-02-08 RX ADMIN — ROSUVASTATIN CALCIUM 20 MG: 10 TABLET, FILM COATED ORAL at 09:13

## 2024-02-08 RX ADMIN — TAMSULOSIN HYDROCHLORIDE 0.4 MG: 0.4 CAPSULE ORAL at 20:10

## 2024-02-08 RX ADMIN — CEFTRIAXONE SODIUM 1 G: 1 INJECTION, SOLUTION INTRAVENOUS at 10:41

## 2024-02-08 RX ADMIN — LOSARTAN POTASSIUM 25 MG: 25 TABLET, FILM COATED ORAL at 20:09

## 2024-02-08 RX ADMIN — CITALOPRAM HYDROBROMIDE 40 MG: 20 TABLET ORAL at 09:13

## 2024-02-08 ASSESSMENT — COGNITIVE AND FUNCTIONAL STATUS - GENERAL
CLIMB 3 TO 5 STEPS WITH RAILING: TOTAL
DRESSING REGULAR LOWER BODY CLOTHING: A LITTLE
DAILY ACTIVITIY SCORE: 14
TURNING FROM BACK TO SIDE WHILE IN FLAT BAD: A LOT
DRESSING REGULAR LOWER BODY CLOTHING: A LITTLE
TURNING FROM BACK TO SIDE WHILE IN FLAT BAD: A LOT
TURNING FROM BACK TO SIDE WHILE IN FLAT BAD: A LOT
MOBILITY SCORE: 13
HELP NEEDED FOR BATHING: A LITTLE
MOBILITY SCORE: 13
STANDING UP FROM CHAIR USING ARMS: A LOT
DAILY ACTIVITIY SCORE: 21
MOVING TO AND FROM BED TO CHAIR: A LOT
TOILETING: TOTAL
MOVING TO AND FROM BED TO CHAIR: A LOT
WALKING IN HOSPITAL ROOM: A LOT
STANDING UP FROM CHAIR USING ARMS: A LOT
WALKING IN HOSPITAL ROOM: A LOT
TOILETING: A LITTLE
DRESSING REGULAR LOWER BODY CLOTHING: TOTAL
TOILETING: A LITTLE
DAILY ACTIVITIY SCORE: 21
DRESSING REGULAR UPPER BODY CLOTHING: A LOT
HELP NEEDED FOR BATHING: A LOT
CLIMB 3 TO 5 STEPS WITH RAILING: TOTAL
MOBILITY SCORE: 12
MOVING FROM LYING ON BACK TO SITTING ON SIDE OF FLAT BED WITH BEDRAILS: A LITTLE
MOVING TO AND FROM BED TO CHAIR: A LOT
CLIMB 3 TO 5 STEPS WITH RAILING: TOTAL
STANDING UP FROM CHAIR USING ARMS: A LOT
HELP NEEDED FOR BATHING: A LITTLE
WALKING IN HOSPITAL ROOM: A LOT

## 2024-02-08 ASSESSMENT — PAIN SCALES - GENERAL
PAINLEVEL_OUTOF10: 0 - NO PAIN
PAINLEVEL_OUTOF10: 0 - NO PAIN

## 2024-02-08 ASSESSMENT — PAIN - FUNCTIONAL ASSESSMENT
PAIN_FUNCTIONAL_ASSESSMENT: 0-10
PAIN_FUNCTIONAL_ASSESSMENT: 0-10

## 2024-02-08 NOTE — PROGRESS NOTES
Physical Therapy    Physical Therapy Treatment    Patient Name: Aldo Rayo  MRN: [id number]  Today's Date: 2/8/2024  Time Calculation  Start Time: 1105  Stop Time: 1129  Time Calculation (min): 24 min       Assessment/Plan   PT Assessment  PT Assessment Results: Decreased strength, Decreased endurance, Impaired balance, Decreased mobility, Decreased coordination, Decreased cognition  End of Session Communication: Bedside nurse  End of Session Patient Position: Bed, 3 rail up, Alarm on (hob elevated to comfort, call light in reach, scd's donned & activated)     PT Plan  Treatment/Interventions: Bed mobility, Transfer training, Gait training, Strengthening  PT Plan: Skilled PT  PT Frequency: 3 times per week  PT Discharge Recommendations: Moderate intensity level of continued care  PT - OK to Discharge: Yes    Current Problem:  Patient Active Problem List   Diagnosis    UTI (urinary tract infection)       General Visit Information:   PT  Visit  PT Received On: 02/08/24  General  Family/Caregiver Present:  (spouse Josephine present last half of treatment session , supportive)  Co-Treatment: OT  Co-Treatment Reason: to maximize pt safety& mobility  Prior to Session Communication:  (per conference w/ RN, pt medically stable for participation in PT tx session)  General Comment: generally lethargic w/ flat affect, report of dizziness & fatigue limiting activity tolerance  Subjective     Precautions:  Precautions  Precautions Comment: fall, alarm, purewick, acd's, IV  Cognition:  Cognition  Overall Cognitive Status:  (a&ox3 w/ effort for year, slow processing)  Treatments:  Therapeutic Exercise  Therapeutic Exercise Performed:  (ble therex to facilitate inc fxl mobilty & gait stability, cues for proper technique & maximum tolerable effort, performed supine: ankle pumps, heel slides; sitting: laq & hip flexion l88mbkb each)        Bed Mobility  Bed Mobility:  (mod assist x1 supine<.>sit)  Ambulation/Gait  Training  Ambulation/Gait Training Performed:  (mod assist x1 w/ ww 3ft sidestepping rt toward hob, further distance limited by decreased stability, decreased endurance & dizziness)  Transfers  Transfer:  (mod assist x1 sit<>stand elevated bed <> ww performed x2reps, cues for positioning to edge of bed to increase ease of mobility, cues for safe hand plcmt, cues for widened base of support)          Outcome Measures:  [facility] Basic Mobility  Turning from your back to your side while in a flat bed without using bedrails: A little  Moving from lying on your back to sitting on the side of a flat bed without using bedrails: A lot  Moving to and from bed to chair (including a wheelchair): A lot  Standing up from a chair using your arms (e.g. wheelchair or bedside chair): A lot  To walk in hospital room: A lot  Climbing 3-5 steps with railing: Total  Basic Mobility - Total Score: 12  Education Documentation  Mobility Training, taught by Yvette Schuler, PT at 2/8/2024  1:47 PM.  Learner: Significant Other, Patient  Readiness: Acceptance  Method: Explanation  Response: Verbalizes Understanding, Needs Reinforcement  Comment: safety, activity progression, use of ww, performance of le arom  EDUCATION:     Encounter Problems       Encounter Problems (Active)       PT Problem       PT Goal 1 (Progressing)       Start:  02/07/24    Expected End:  02/21/24       Indep bed mob          PT Goal 2 (Progressing)       Start:  02/07/24    Expected End:  02/21/24       Sba txs          PT Goal 3 (Progressing)       Start:  02/07/24    Expected End:  02/21/24       Sba amb w/ lad 15oft x4         PT Goal 4 (Progressing)       Start:  02/07/24    Expected End:  02/21/24       20-30 reps ble there ex

## 2024-02-08 NOTE — PROGRESS NOTES
Occupational Therapy    Evaluation    Patient Name: Aldo Rayo  MRN: [id number]  Today's Date: 2/8/2024  Time Calculation  Start Time: 1102  Stop Time: 1129  Time Calculation (min): 27 min        Assessment:  End of Session Communication: Bedside nurse  End of Session Patient Position: Bed, 3 rail up, Alarm on (hob elevated to comfort, call light in reach, scd's donned & activated)     Plan:  Treatment Interventions: ADL retraining, Functional transfer training, UE strengthening/ROM, Endurance training, Compensatory technique education  OT Frequency: 3 times per week  OT Discharge Recommendations: Moderate intensity level of continued care  OT - OK to Discharge: Yes (to next level of care when cleared by medical team)  Treatment Interventions: ADL retraining, Functional transfer training, UE strengthening/ROM, Endurance training, Compensatory technique education    Subjective   Current Problem:  1. Acute cystitis with hematuria          General:  General  Reason for Referral: impaired adl  Past Medical History Relevant to Rehab: dx:  uti cc:  confusion, urinary frequency, oriented x 2, cystitis with  hematuria, htn, metabolic encephalopathy pmh:  anxiety, bph, cad, ckd, gib, gout, htn, hypothyroidism, cva x 2, cardiomegaly, tia x 2, bowel perforation, cabg  Family/Caregiver Present: Yes  Caregiver Feedback: spouse  Prior to Session Communication: Bedside nurse  Patient Position Received: Bed, 4 rail up, Alarm on  General Comment: agreeable to therapy intervention  Precautions:  Precautions Comment: fall, alarm, external catheter, scd's, iv  Vital Signs:     Pain:       Objective   Cognition:  Overall Cognitive Status:  (a&0 x 3, slower processing, cues for safety, dizziness when seated eob)           Home Living:  Home Living Comments: pt. lives with spouse, in the process of moving from [place] to [place], 1 floor home, no entry steps, st. cane, wh. walker, tub/shower with gb, stall shower with gb x 2, st. toilet,  shower chair, [facility]  Prior Function:  Prior Function Comments: pt. uses st. cane, drives, shares homemaking with spouse  IADL History:     ADL:  ADL Comments: dependent to don socks seated eob (pt. attempted but unable due to balance/dizziness), able to wash his face/underarms and apply deoderant, assist provided for bathing pt's back and dependent for jada hygiene in standing  Activity Tolerance:     Bed Mobility/Transfers: Bed Mobility  Bed Mobility:  (mod assist x 1 supine to sit and min assist sit to supine)    Transfers  Transfer:  (sit<> stand from eob required mod assist x 1)      Ambulation/Gait Training:  Ambulation/Gait Training  Ambulation/Gait Training Performed:  (pt. able to sidestep with use of wh. walker mod assist x 1)     Strength:  Strength Comments: grossly bue's at least 3/5    Outcome Measures:[facility] Daily Activity  Putting on and taking off regular lower body clothing: Total  Bathing (including washing, rinsing, drying): A lot  Putting on and taking off regular upper body clothing: A lot  Toileting, which includes using toilet, bedpan or urinal: Total  Taking care of personal grooming such as brushing teeth: None  Eating Meals: None  Daily Activity - Total Score: 14        Education Documentation  Body Mechanics, taught by Elis Aquino OT at 2/8/2024  2:20 PM.  Learner: Patient  Readiness: Acceptance  Method: Explanation  Response: Verbalizes Understanding, Needs Reinforcement    ADL Training, taught by Elis Aquino OT at 2/8/2024  2:20 PM.  Learner: Patient  Readiness: Acceptance  Method: Explanation  Response: Verbalizes Understanding, Needs Reinforcement    Education Comments  No comments found.        OP EDUCATION:       Goals:  Encounter Problems       Encounter Problems (Active)       OT Goals       Increase functional mobility and  functional transfers to [facility] for bed/chair/toilet with dme prn   (Progressing)       Start:  02/08/24    Expected End:  02/22/24            increase bue ther  ex/activity x 7-10 minutes and increase standing tolerance with cga x 3-5 minutes to promote greater activity tolerance for assist with adl.   (Progressing)       Start:  02/08/24    Expected End:  02/22/24            Increase ub/lb dressing to cga with dme prn  (Progressing)       Start:  02/08/24    Expected End:  02/22/24            Increase ub/lb bathing to cga with dme prn  (Progressing)       Start:  02/08/24    Expected End:  02/22/24            Increase toileting to cga with dme prn  (Progressing)       Start:  02/08/24    Expected End:  02/22/24

## 2024-02-08 NOTE — CARE PLAN
The patient's goals for the shift include      The clinical goals for the shift include Patient will be free from falls throughout the shift.  Outcome: Met      Problem: Safety  Goal: Patient will be injury free during hospitalization  Outcome: Progressing     Problem: Safety  Goal: I will remain free of falls  Outcome: Progressing

## 2024-02-08 NOTE — PROGRESS NOTES
Subjective  Pt denies pain and SOB. Appetite is good. He is unsure whether he had a BM (one was charted O/N). He is agreeable to placement.     Objective    Vitals:    02/08/24 0900   BP: 170/90   Pulse: 74   Resp:    Temp:    SpO2:        Vitals:    02/07/24 1416   Weight: 72.6 kg (160 lb)       Scheduled medications  cefTRIAXone, 1 g, intravenous, q24h  citalopram, 40 mg, oral, Daily  clopidogrel, 75 mg, oral, Daily  famotidine, 20 mg, oral, BID  heparin (porcine), 5,000 Units, subcutaneous, q8h  levothyroxine, 175 mcg, oral, Daily before breakfast  losartan, 25 mg, oral, Nightly  metoprolol succinate XL, 12.5 mg, oral, Daily  pantoprazole, 40 mg, oral, Daily  rosuvastatin, 20 mg, oral, Daily  sennosides, 2 tablet, oral, BID  tamsulosin, 0.4 mg, oral, BID      Continuous medications     PRN medications  PRN medications: acetaminophen    Results for orders placed or performed during the hospital encounter of 02/07/24 (from the past 24 hour(s))   Potassium   Result Value Ref Range    Potassium 4.9 3.5 - 5.3 mmol/L   CBC   Result Value Ref Range    WBC 18.9 (H) 4.4 - 11.3 x10*3/uL    nRBC 0.0 0.0 - 0.0 /100 WBCs    RBC 4.13 (L) 4.50 - 5.90 x10*6/uL    Hemoglobin 13.5 13.5 - 17.5 g/dL    Hematocrit 40.2 (L) 41.0 - 52.0 %    MCV 97 80 - 100 fL    MCH 32.7 26.0 - 34.0 pg    MCHC 33.6 32.0 - 36.0 g/dL    RDW 12.8 11.5 - 14.5 %    Platelets 118 (L) 150 - 450 x10*3/uL   Basic metabolic panel   Result Value Ref Range    Glucose 87 74 - 99 mg/dL    Sodium 136 136 - 145 mmol/L    Potassium 3.7 3.5 - 5.3 mmol/L    Chloride 104 98 - 107 mmol/L    Bicarbonate 21 21 - 32 mmol/L    Anion Gap 15 10 - 20 mmol/L    Urea Nitrogen 13 6 - 23 mg/dL    Creatinine 1.23 0.50 - 1.30 mg/dL    eGFR 63 >60 mL/min/1.73m*2    Calcium 9.5 8.6 - 10.3 mg/dL       Constitutional: Well developed, awake, alert, oriented x3, no acute distress, cooperative   Eyes: EOMI, clear sclera   ENMT: mucous membranes moist, no lesions seen   Head/Neck: Neck  "supple, no apparent injury, head atraumatic   Respiratory/Thorax: CTAB, good chest expansion, respirations even and unlabored   Cardiovascular: Regular rate and rhythm, no murmurs/rubs/gallops, normal S1 and S 2   Gastrointestinal: Abdomen nondistended, soft, nontender, hyperactive BS, no bruits   Musculoskeletal: ROM intact, no joint swelling, normal  strength   Extremities: no cyanosis, edema, contusions or clubbing   Neurological: no focal deficit, pt alert and oriented x3   Psychological: Appropriate affect and behavior, pleasant   Skin: Warm and dry, no lesions, no rashes       Past Medical History:   Diagnosis Date    Anxiety     Bowel perforation (CMS/HCC)     during colonoscopy    BPH (benign prostatic hyperplasia)     CAD (coronary artery disease)     CKD (chronic kidney disease)     Erectile dysfunction     Gout     HTN (hypertension)     Hypothyroidism     Stroke (cerebrum) (CMS/HCC) 2009        Assessment/Plan  GNB UTI      - day #2 Rocephin pending urine C+S, no fevers in almost 24h, leukocytosis uptrending, will follow  Metabolic encphalopathy     - no hx dementia, per family pt gets confused with infections, pt appropriate on exam yesterday and today       - supportive care, should resolve in tandem with ATB treatment   Hx of stroke x2 and TIA x2  CAD s/p CABG x3     - continue Plavix, metoprolol and statin     - pt off ASA and on \"prophylactic\" PPI for hx of GIB, which dtr says was a bowel perf during colonoscopy  Hx CKD      - appears to be at baseline (~1.3-1.4 per EMR)  HTN, uncontrolled     - BP has been well controlled in the past on current home meds   DVT ppx with subcutaneous heparin  Discharge disposition     - pt will require placement and is agreeable      Francheska Pryor, CNP  Hospital Medicine    "

## 2024-02-09 PROBLEM — N30.01 ACUTE CYSTITIS WITH HEMATURIA: Status: ACTIVE | Noted: 2024-02-09

## 2024-02-09 LAB
ANION GAP SERPL CALC-SCNC: 15 MMOL/L (ref 10–20)
BACTERIA UR CULT: ABNORMAL
BUN SERPL-MCNC: 15 MG/DL (ref 6–23)
CALCIUM SERPL-MCNC: 9.6 MG/DL (ref 8.6–10.3)
CHLORIDE SERPL-SCNC: 104 MMOL/L (ref 98–107)
CO2 SERPL-SCNC: 22 MMOL/L (ref 21–32)
CREAT SERPL-MCNC: 1.31 MG/DL (ref 0.5–1.3)
EGFRCR SERPLBLD CKD-EPI 2021: 59 ML/MIN/1.73M*2
ERYTHROCYTE [DISTWIDTH] IN BLOOD BY AUTOMATED COUNT: 12.3 % (ref 11.5–14.5)
GLUCOSE SERPL-MCNC: 125 MG/DL (ref 74–99)
HCT VFR BLD AUTO: 41.5 % (ref 41–52)
HGB BLD-MCNC: 14.3 G/DL (ref 13.5–17.5)
MCH RBC QN AUTO: 32.8 PG (ref 26–34)
MCHC RBC AUTO-ENTMCNC: 34.5 G/DL (ref 32–36)
MCV RBC AUTO: 95 FL (ref 80–100)
NRBC BLD-RTO: 0 /100 WBCS (ref 0–0)
PLATELET # BLD AUTO: 127 X10*3/UL (ref 150–450)
POTASSIUM SERPL-SCNC: 3.7 MMOL/L (ref 3.5–5.3)
RBC # BLD AUTO: 4.36 X10*6/UL (ref 4.5–5.9)
SODIUM SERPL-SCNC: 137 MMOL/L (ref 136–145)
WBC # BLD AUTO: 10.3 X10*3/UL (ref 4.4–11.3)

## 2024-02-09 PROCEDURE — 2500000004 HC RX 250 GENERAL PHARMACY W/ HCPCS (ALT 636 FOR OP/ED): Performed by: STUDENT IN AN ORGANIZED HEALTH CARE EDUCATION/TRAINING PROGRAM

## 2024-02-09 PROCEDURE — 2500000001 HC RX 250 WO HCPCS SELF ADMINISTERED DRUGS (ALT 637 FOR MEDICARE OP): Performed by: NURSE PRACTITIONER

## 2024-02-09 PROCEDURE — 2500000002 HC RX 250 W HCPCS SELF ADMINISTERED DRUGS (ALT 637 FOR MEDICARE OP, ALT 636 FOR OP/ED): Performed by: NURSE PRACTITIONER

## 2024-02-09 PROCEDURE — 36415 COLL VENOUS BLD VENIPUNCTURE: CPT | Performed by: NURSE PRACTITIONER

## 2024-02-09 PROCEDURE — 87040 BLOOD CULTURE FOR BACTERIA: CPT | Mod: PARLAB | Performed by: STUDENT IN AN ORGANIZED HEALTH CARE EDUCATION/TRAINING PROGRAM

## 2024-02-09 PROCEDURE — 2500000004 HC RX 250 GENERAL PHARMACY W/ HCPCS (ALT 636 FOR OP/ED): Performed by: NURSE PRACTITIONER

## 2024-02-09 PROCEDURE — 80048 BASIC METABOLIC PNL TOTAL CA: CPT | Performed by: NURSE PRACTITIONER

## 2024-02-09 PROCEDURE — 99232 SBSQ HOSP IP/OBS MODERATE 35: CPT | Performed by: INTERNAL MEDICINE

## 2024-02-09 PROCEDURE — 1200000002 HC GENERAL ROOM WITH TELEMETRY DAILY

## 2024-02-09 PROCEDURE — 85027 COMPLETE CBC AUTOMATED: CPT | Performed by: NURSE PRACTITIONER

## 2024-02-09 PROCEDURE — 36415 COLL VENOUS BLD VENIPUNCTURE: CPT | Performed by: STUDENT IN AN ORGANIZED HEALTH CARE EDUCATION/TRAINING PROGRAM

## 2024-02-09 RX ORDER — SULFAMETHOXAZOLE AND TRIMETHOPRIM 800; 160 MG/1; MG/1
160 TABLET ORAL EVERY 12 HOURS SCHEDULED
Status: DISCONTINUED | OUTPATIENT
Start: 2024-02-09 | End: 2024-02-10

## 2024-02-09 RX ORDER — CEFTRIAXONE 2 G/50ML
2 INJECTION, SOLUTION INTRAVENOUS EVERY 24 HOURS
Status: DISCONTINUED | OUTPATIENT
Start: 2024-02-09 | End: 2024-02-09

## 2024-02-09 RX ADMIN — HEPARIN SODIUM 5000 UNITS: 5000 INJECTION INTRAVENOUS; SUBCUTANEOUS at 08:25

## 2024-02-09 RX ADMIN — SULFAMETHOXAZOLE AND TRIMETHOPRIM 160 MG: 800; 160 TABLET ORAL at 20:26

## 2024-02-09 RX ADMIN — PANTOPRAZOLE SODIUM 40 MG: 40 TABLET, DELAYED RELEASE ORAL at 05:05

## 2024-02-09 RX ADMIN — SULFAMETHOXAZOLE AND TRIMETHOPRIM 160 MG: 800; 160 TABLET ORAL at 13:26

## 2024-02-09 RX ADMIN — CITALOPRAM HYDROBROMIDE 40 MG: 20 TABLET ORAL at 09:30

## 2024-02-09 RX ADMIN — CEFTRIAXONE SODIUM 2 G: 2 INJECTION, SOLUTION INTRAVENOUS at 11:40

## 2024-02-09 RX ADMIN — ACETAMINOPHEN 650 MG: 325 TABLET ORAL at 20:26

## 2024-02-09 RX ADMIN — CLOPIDOGREL BISULFATE 75 MG: 75 TABLET ORAL at 09:30

## 2024-02-09 RX ADMIN — LOSARTAN POTASSIUM 25 MG: 25 TABLET, FILM COATED ORAL at 20:26

## 2024-02-09 RX ADMIN — ACETAMINOPHEN 650 MG: 325 TABLET ORAL at 08:25

## 2024-02-09 RX ADMIN — FAMOTIDINE 20 MG: 20 TABLET ORAL at 09:30

## 2024-02-09 RX ADMIN — TAMSULOSIN HYDROCHLORIDE 0.4 MG: 0.4 CAPSULE ORAL at 09:30

## 2024-02-09 RX ADMIN — ROSUVASTATIN CALCIUM 20 MG: 10 TABLET, FILM COATED ORAL at 09:30

## 2024-02-09 RX ADMIN — LEVOTHYROXINE SODIUM 175 MCG: 175 TABLET ORAL at 05:05

## 2024-02-09 RX ADMIN — TAMSULOSIN HYDROCHLORIDE 0.4 MG: 0.4 CAPSULE ORAL at 20:26

## 2024-02-09 RX ADMIN — FAMOTIDINE 20 MG: 20 TABLET ORAL at 20:26

## 2024-02-09 RX ADMIN — HEPARIN SODIUM 5000 UNITS: 5000 INJECTION INTRAVENOUS; SUBCUTANEOUS at 00:27

## 2024-02-09 RX ADMIN — SENNOSIDES 17.2 MG: 8.6 TABLET, FILM COATED ORAL at 09:30

## 2024-02-09 RX ADMIN — HEPARIN SODIUM 5000 UNITS: 5000 INJECTION INTRAVENOUS; SUBCUTANEOUS at 16:57

## 2024-02-09 RX ADMIN — METOPROLOL SUCCINATE 12.5 MG: 25 TABLET, EXTENDED RELEASE ORAL at 09:30

## 2024-02-09 RX ADMIN — SENNOSIDES 17.2 MG: 8.6 TABLET, FILM COATED ORAL at 20:26

## 2024-02-09 ASSESSMENT — PAIN - FUNCTIONAL ASSESSMENT
PAIN_FUNCTIONAL_ASSESSMENT: 0-10

## 2024-02-09 ASSESSMENT — PAIN DESCRIPTION - DESCRIPTORS
DESCRIPTORS: ACHING
DESCRIPTORS: ACHING

## 2024-02-09 ASSESSMENT — COGNITIVE AND FUNCTIONAL STATUS - GENERAL
DAILY ACTIVITIY SCORE: 21
MOBILITY SCORE: 13
STANDING UP FROM CHAIR USING ARMS: A LOT
CLIMB 3 TO 5 STEPS WITH RAILING: TOTAL
HELP NEEDED FOR BATHING: A LITTLE
TOILETING: A LITTLE
MOVING TO AND FROM BED TO CHAIR: A LOT
TURNING FROM BACK TO SIDE WHILE IN FLAT BAD: A LOT
DRESSING REGULAR LOWER BODY CLOTHING: A LITTLE
WALKING IN HOSPITAL ROOM: A LOT

## 2024-02-09 ASSESSMENT — PAIN SCALES - GENERAL
PAINLEVEL_OUTOF10: 10 - WORST POSSIBLE PAIN
PAINLEVEL_OUTOF10: 2
PAINLEVEL_OUTOF10: 3
PAINLEVEL_OUTOF10: 8

## 2024-02-09 ASSESSMENT — PAIN DESCRIPTION - LOCATION: LOCATION: SHOULDER

## 2024-02-09 NOTE — PROGRESS NOTES
TCC Note: Met with pt at bedside, wife was on the phone. Explained to both about OBS pt vs Inpatient and the need for SNF. Messaged MD regarding pt needing SNF. Pt is Inpatient status now. While I was speaking to pt, NP came into the room as well. Obtained SNF choices from pt. Will send those referrals to [facility] and [facility] and follow for precert. Cintia Wharton, MSN, RN, TCC.     ADDENDUM: pt accepted by [address] [place] [address]. Request to start precert sent to our Precert team. Pt is Medicare and is able to admit on Monday. Will follow for precert. Cintia Wharton, MSN, RN, TCC.

## 2024-02-09 NOTE — CARE PLAN
The patient's goals for the shift include      The clinical goals for the shift include Patient will be free from falls throughout the shift  Outcome: Met    Problem: Safety  Goal: Patient will be injury free during hospitalization  Outcome: Progressing     Problem: Safety  Goal: I will remain free of falls  Outcome: Progressing

## 2024-02-09 NOTE — CARE PLAN
Problem: Pain  Goal: My pain/discomfort is manageable  Outcome: Progressing     Problem: Safety  Goal: Patient will be injury free during hospitalization  Outcome: Progressing  Goal: I will remain free of falls  Outcome: Progressing     Problem: Daily Care  Goal: Daily care needs are met  Outcome: Progressing     Problem: Psychosocial Needs  Goal: Demonstrates ability to cope with hospitalization/illness  Outcome: Progressing  Goal: Collaborate with me, my family, and caregiver to identify my specific goals  Outcome: Progressing     Problem: Discharge Barriers  Goal: My discharge needs are met  Outcome: Progressing   The patient's goals for the shift include      The clinical goals for the shift include Patient will be free from falls throughout the shift    Over the shift, the patient did make progress toward the following goals. Barriers to progression include Pt has not fallen.

## 2024-02-09 NOTE — PROGRESS NOTES
Subjective  Pt denies pain, nausea and SOB. He says he feels warm since earlier this morning. CASSY at bedside and dtr on phone (speaking with TCC as I enter the room).    Objective    Vitals:    02/09/24 0512   BP: (!) 191/96   Pulse: 80   Resp:    Temp: 37.6 °C (99.7 °F)   SpO2: 92%       Vitals:    02/07/24 1416   Weight: 72.6 kg (160 lb)       Scheduled medications  cefTRIAXone, 2 g, intravenous, q24h  citalopram, 40 mg, oral, Daily  clopidogrel, 75 mg, oral, Daily  famotidine, 20 mg, oral, BID  heparin (porcine), 5,000 Units, subcutaneous, q8h  levothyroxine, 175 mcg, oral, Daily before breakfast  losartan, 25 mg, oral, Nightly  metoprolol succinate XL, 12.5 mg, oral, Daily  pantoprazole, 40 mg, oral, Daily  rosuvastatin, 20 mg, oral, Daily  sennosides, 2 tablet, oral, BID  tamsulosin, 0.4 mg, oral, BID      Continuous medications     PRN medications  PRN medications: acetaminophen    Results for orders placed or performed during the hospital encounter of 02/07/24 (from the past 24 hour(s))   Basic Metabolic Panel   Result Value Ref Range    Glucose 125 (H) 74 - 99 mg/dL    Sodium 137 136 - 145 mmol/L    Potassium 3.7 3.5 - 5.3 mmol/L    Chloride 104 98 - 107 mmol/L    Bicarbonate 22 21 - 32 mmol/L    Anion Gap 15 10 - 20 mmol/L    Urea Nitrogen 15 6 - 23 mg/dL    Creatinine 1.31 (H) 0.50 - 1.30 mg/dL    eGFR 59 (L) >60 mL/min/1.73m*2    Calcium 9.6 8.6 - 10.3 mg/dL   CBC   Result Value Ref Range    WBC 10.3 4.4 - 11.3 x10*3/uL    nRBC 0.0 0.0 - 0.0 /100 WBCs    RBC 4.36 (L) 4.50 - 5.90 x10*6/uL    Hemoglobin 14.3 13.5 - 17.5 g/dL    Hematocrit 41.5 41.0 - 52.0 %    MCV 95 80 - 100 fL    MCH 32.8 26.0 - 34.0 pg    MCHC 34.5 32.0 - 36.0 g/dL    RDW 12.3 11.5 - 14.5 %    Platelets 127 (L) 150 - 450 x10*3/uL       Constitutional: Well developed, awake, alert, oriented x3, no acute distress, cooperative   Eyes: EOMI, clear sclera   ENMT: mucous membranes moist, no lesions seen   Head/Neck: Neck supple, no apparent  "injury, head atraumatic   Respiratory/Thorax: CTAB, good chest expansion, respirations even and unlabored   Cardiovascular: Regular rate and rhythm, no murmurs/rubs/gallops, normal S1 and S 2   Gastrointestinal: Abdomen nondistended, soft, nontender, hyperactive BS, no bruits   Musculoskeletal: ROM intact, no joint swelling, normal  strength   Extremities: no cyanosis, edema, contusions or clubbing   Neurological: no focal deficit, pt alert and oriented x3   Psychological: Appropriate affect and behavior   Skin: Warm and dry, no lesions, no rashes       Past Medical History:   Diagnosis Date    Anxiety     Bowel perforation (CMS/HCC)     during colonoscopy    BPH (benign prostatic hyperplasia)     CAD (coronary artery disease)     CKD (chronic kidney disease)     Erectile dysfunction     Gout     HTN (hypertension)     Hypothyroidism     Stroke (cerebrum) (CMS/HCC) 2009        Assessment/Plan  GNB UTI      - day #2 Rocephin pending urine C+S, no fevers in 48 hrs, leukocytosis resolved  Metabolic encphalopathy     - no hx dementia, per family pt gets confused with infections     - supportive care, should resolve in tandem with ATB treatment   Hx of stroke x2 and TIA x2  CAD s/p CABG x3     - continue Plavix, metoprolol and statin     - pt off ASA and on \"prophylactic\" PPI for hx of GIB, which dtr says was a bowel perf during colonoscopy  Hx CKD      - appears to be at baseline (~1.3-1.4 per EMR)  HTN, uncontrolled     - BP elevated today, has been well controlled in the past on current home meds, monitor   DVT ppx with subcutaneous heparin  Discharge disposition     - pt will require placement and is agreeable      Francheska Pryor, CNP  Hospital Medicine    "

## 2024-02-10 LAB
ANION GAP SERPL CALC-SCNC: 13 MMOL/L (ref 10–20)
ATRIAL RATE: 77 BPM
BUN SERPL-MCNC: 14 MG/DL (ref 6–23)
CALCIUM SERPL-MCNC: 10.1 MG/DL (ref 8.6–10.3)
CHLORIDE SERPL-SCNC: 102 MMOL/L (ref 98–107)
CO2 SERPL-SCNC: 27 MMOL/L (ref 21–32)
CREAT SERPL-MCNC: 1.48 MG/DL (ref 0.5–1.3)
EGFRCR SERPLBLD CKD-EPI 2021: 51 ML/MIN/1.73M*2
ERYTHROCYTE [DISTWIDTH] IN BLOOD BY AUTOMATED COUNT: 12.6 % (ref 11.5–14.5)
GLUCOSE SERPL-MCNC: 98 MG/DL (ref 74–99)
HCT VFR BLD AUTO: 44.4 % (ref 41–52)
HGB BLD-MCNC: 14.6 G/DL (ref 13.5–17.5)
MCH RBC QN AUTO: 32 PG (ref 26–34)
MCHC RBC AUTO-ENTMCNC: 32.9 G/DL (ref 32–36)
MCV RBC AUTO: 97 FL (ref 80–100)
NRBC BLD-RTO: 0 /100 WBCS (ref 0–0)
P AXIS: 43 DEGREES
P OFFSET: 191 MS
P ONSET: 137 MS
PLATELET # BLD AUTO: 139 X10*3/UL (ref 150–450)
POTASSIUM SERPL-SCNC: 3.7 MMOL/L (ref 3.5–5.3)
PR INTERVAL: 140 MS
Q ONSET: 207 MS
QRS COUNT: 12 BEATS
QRS DURATION: 94 MS
QT INTERVAL: 382 MS
QTC CALCULATION(BAZETT): 432 MS
QTC FREDERICIA: 415 MS
R AXIS: -48 DEGREES
RBC # BLD AUTO: 4.56 X10*6/UL (ref 4.5–5.9)
SODIUM SERPL-SCNC: 138 MMOL/L (ref 136–145)
T AXIS: 5 DEGREES
T OFFSET: 398 MS
VENTRICULAR RATE: 77 BPM
WBC # BLD AUTO: 6.6 X10*3/UL (ref 4.4–11.3)

## 2024-02-10 PROCEDURE — 82374 ASSAY BLOOD CARBON DIOXIDE: CPT | Performed by: NURSE PRACTITIONER

## 2024-02-10 PROCEDURE — 85027 COMPLETE CBC AUTOMATED: CPT | Performed by: NURSE PRACTITIONER

## 2024-02-10 PROCEDURE — 36415 COLL VENOUS BLD VENIPUNCTURE: CPT | Performed by: NURSE PRACTITIONER

## 2024-02-10 PROCEDURE — 1200000002 HC GENERAL ROOM WITH TELEMETRY DAILY

## 2024-02-10 PROCEDURE — 2500000001 HC RX 250 WO HCPCS SELF ADMINISTERED DRUGS (ALT 637 FOR MEDICARE OP): Performed by: NURSE PRACTITIONER

## 2024-02-10 PROCEDURE — 2500000004 HC RX 250 GENERAL PHARMACY W/ HCPCS (ALT 636 FOR OP/ED): Performed by: NURSE PRACTITIONER

## 2024-02-10 PROCEDURE — 2500000002 HC RX 250 W HCPCS SELF ADMINISTERED DRUGS (ALT 637 FOR MEDICARE OP, ALT 636 FOR OP/ED): Performed by: NURSE PRACTITIONER

## 2024-02-10 PROCEDURE — 2500000004 HC RX 250 GENERAL PHARMACY W/ HCPCS (ALT 636 FOR OP/ED): Performed by: INTERNAL MEDICINE

## 2024-02-10 RX ORDER — HYDRALAZINE HYDROCHLORIDE 20 MG/ML
10 INJECTION INTRAMUSCULAR; INTRAVENOUS EVERY 6 HOURS PRN
Status: DISCONTINUED | OUTPATIENT
Start: 2024-02-10 | End: 2024-02-13 | Stop reason: HOSPADM

## 2024-02-10 RX ORDER — CEFTRIAXONE 1 G/50ML
1 INJECTION, SOLUTION INTRAVENOUS EVERY 24 HOURS
Status: DISCONTINUED | OUTPATIENT
Start: 2024-02-10 | End: 2024-02-13 | Stop reason: HOSPADM

## 2024-02-10 RX ADMIN — PANTOPRAZOLE SODIUM 40 MG: 40 TABLET, DELAYED RELEASE ORAL at 06:13

## 2024-02-10 RX ADMIN — HEPARIN SODIUM 5000 UNITS: 5000 INJECTION INTRAVENOUS; SUBCUTANEOUS at 23:44

## 2024-02-10 RX ADMIN — SODIUM CHLORIDE 250 ML: 9 INJECTION, SOLUTION INTRAVENOUS at 09:29

## 2024-02-10 RX ADMIN — CEFTRIAXONE SODIUM 1 G: 1 INJECTION, SOLUTION INTRAVENOUS at 09:29

## 2024-02-10 RX ADMIN — TAMSULOSIN HYDROCHLORIDE 0.4 MG: 0.4 CAPSULE ORAL at 20:31

## 2024-02-10 RX ADMIN — FAMOTIDINE 20 MG: 20 TABLET ORAL at 08:21

## 2024-02-10 RX ADMIN — ROSUVASTATIN CALCIUM 20 MG: 10 TABLET, FILM COATED ORAL at 08:22

## 2024-02-10 RX ADMIN — METOPROLOL SUCCINATE 12.5 MG: 25 TABLET, EXTENDED RELEASE ORAL at 08:22

## 2024-02-10 RX ADMIN — TAMSULOSIN HYDROCHLORIDE 0.4 MG: 0.4 CAPSULE ORAL at 08:21

## 2024-02-10 RX ADMIN — HEPARIN SODIUM 5000 UNITS: 5000 INJECTION INTRAVENOUS; SUBCUTANEOUS at 15:50

## 2024-02-10 RX ADMIN — POLYVINYL ALCOHOL, POVIDONE 1 DROP: 14; 6 SOLUTION/ DROPS OPHTHALMIC at 15:50

## 2024-02-10 RX ADMIN — LEVOTHYROXINE SODIUM 175 MCG: 175 TABLET ORAL at 06:13

## 2024-02-10 RX ADMIN — HEPARIN SODIUM 5000 UNITS: 5000 INJECTION INTRAVENOUS; SUBCUTANEOUS at 08:22

## 2024-02-10 RX ADMIN — SENNOSIDES 17.2 MG: 8.6 TABLET, FILM COATED ORAL at 20:31

## 2024-02-10 RX ADMIN — FAMOTIDINE 20 MG: 20 TABLET ORAL at 21:00

## 2024-02-10 RX ADMIN — CITALOPRAM HYDROBROMIDE 40 MG: 20 TABLET ORAL at 08:21

## 2024-02-10 RX ADMIN — ACETAMINOPHEN 650 MG: 325 TABLET ORAL at 18:05

## 2024-02-10 RX ADMIN — ACETAMINOPHEN 650 MG: 325 TABLET ORAL at 06:13

## 2024-02-10 RX ADMIN — LOSARTAN POTASSIUM 25 MG: 25 TABLET, FILM COATED ORAL at 20:32

## 2024-02-10 RX ADMIN — HEPARIN SODIUM 5000 UNITS: 5000 INJECTION INTRAVENOUS; SUBCUTANEOUS at 00:15

## 2024-02-10 RX ADMIN — CLOPIDOGREL BISULFATE 75 MG: 75 TABLET ORAL at 08:22

## 2024-02-10 ASSESSMENT — PAIN SCALES - GENERAL
PAINLEVEL_OUTOF10: 8
PAINLEVEL_OUTOF10: 0 - NO PAIN
PAINLEVEL_OUTOF10: 3
PAINLEVEL_OUTOF10: 2
PAINLEVEL_OUTOF10: 0 - NO PAIN

## 2024-02-10 ASSESSMENT — COGNITIVE AND FUNCTIONAL STATUS - GENERAL
CLIMB 3 TO 5 STEPS WITH RAILING: TOTAL
WALKING IN HOSPITAL ROOM: A LOT
PERSONAL GROOMING: A LITTLE
HELP NEEDED FOR BATHING: A LOT
TOILETING: A LOT
STANDING UP FROM CHAIR USING ARMS: A LOT
MOVING TO AND FROM BED TO CHAIR: A LOT
MOBILITY SCORE: 13
TOILETING: A LOT
HELP NEEDED FOR BATHING: A LOT
DAILY ACTIVITIY SCORE: 16
TURNING FROM BACK TO SIDE WHILE IN FLAT BAD: A LOT
TURNING FROM BACK TO SIDE WHILE IN FLAT BAD: A LOT
STANDING UP FROM CHAIR USING ARMS: A LOT
DRESSING REGULAR UPPER BODY CLOTHING: A LOT
CLIMB 3 TO 5 STEPS WITH RAILING: TOTAL
MOVING TO AND FROM BED TO CHAIR: A LOT
DRESSING REGULAR LOWER BODY CLOTHING: A LOT
DAILY ACTIVITIY SCORE: 15
WALKING IN HOSPITAL ROOM: A LOT
DRESSING REGULAR UPPER BODY CLOTHING: A LITTLE
DRESSING REGULAR LOWER BODY CLOTHING: A LOT
PERSONAL GROOMING: A LITTLE

## 2024-02-10 ASSESSMENT — PAIN - FUNCTIONAL ASSESSMENT
PAIN_FUNCTIONAL_ASSESSMENT: 0-10

## 2024-02-10 ASSESSMENT — PAIN DESCRIPTION - DESCRIPTORS: DESCRIPTORS: ACHING

## 2024-02-10 ASSESSMENT — PAIN DESCRIPTION - LOCATION: LOCATION: HEAD

## 2024-02-10 NOTE — CARE PLAN
The patient's goals for the shift include      The clinical goals for the shift include pt will not fall for the remainder of the shift    Over the shift, the patient did not make progress toward the following goals. Barriers to progression include pt still has BLE weakness and pt is forgetful. Recommendations to address these barriers include continue to keep bed alarm on.

## 2024-02-10 NOTE — CARE PLAN
The patient's goals for the shift include      The clinical goals for the shift include pt will not fall for the remainder of the shift    Over the shift, the patient did not make progress toward the following goals. Barriers to progression include acute illness. Recommendations to address these barriers include communication.

## 2024-02-10 NOTE — PROGRESS NOTES
"Subjective  Pt OOB in chair and says he feels better today because he's OOB. He denies pain. Appetite is good, says he's eating \"whatever they put in front of me\". He hasn't opened a bag of M+M's his CASSY brought 2 days ago because he's afraid he'll eat it all at once. He is oriented x3 but had to concentrate on his answers.     Objective  I personally reviewed all pertinent labwork and imaging, as well as nursing, therapy, discharge planning and consult notes.   Vitals:    02/10/24 0820   BP: (!) 159/93   Pulse:    Resp:    Temp:    SpO2:        Vitals:    02/07/24 1416   Weight: 72.6 kg (160 lb)       Scheduled medications  cefTRIAXone, 1 g, intravenous, q24h  citalopram, 40 mg, oral, Daily  clopidogrel, 75 mg, oral, Daily  famotidine, 20 mg, oral, BID  heparin (porcine), 5,000 Units, subcutaneous, q8h  levothyroxine, 175 mcg, oral, Daily before breakfast  losartan, 25 mg, oral, Nightly  metoprolol succinate XL, 12.5 mg, oral, Daily  pantoprazole, 40 mg, oral, Daily  rosuvastatin, 20 mg, oral, Daily  sennosides, 2 tablet, oral, BID  sodium chloride, 250 mL, intravenous, Once  tamsulosin, 0.4 mg, oral, BID      Continuous medications     PRN medications  PRN medications: acetaminophen, hydrALAZINE    Results for orders placed or performed during the hospital encounter of 02/07/24 (from the past 24 hour(s))   Basic Metabolic Panel   Result Value Ref Range    Glucose 98 74 - 99 mg/dL    Sodium 138 136 - 145 mmol/L    Potassium 3.7 3.5 - 5.3 mmol/L    Chloride 102 98 - 107 mmol/L    Bicarbonate 27 21 - 32 mmol/L    Anion Gap 13 10 - 20 mmol/L    Urea Nitrogen 14 6 - 23 mg/dL    Creatinine 1.48 (H) 0.50 - 1.30 mg/dL    eGFR 51 (L) >60 mL/min/1.73m*2    Calcium 10.1 8.6 - 10.3 mg/dL   CBC   Result Value Ref Range    WBC 6.6 4.4 - 11.3 x10*3/uL    nRBC 0.0 0.0 - 0.0 /100 WBCs    RBC 4.56 4.50 - 5.90 x10*6/uL    Hemoglobin 14.6 13.5 - 17.5 g/dL    Hematocrit 44.4 41.0 - 52.0 %    MCV 97 80 - 100 fL    MCH 32.0 26.0 - 34.0 " "pg    MCHC 32.9 32.0 - 36.0 g/dL    RDW 12.6 11.5 - 14.5 %    Platelets 139 (L) 150 - 450 x10*3/uL       Constitutional: Well developed, awake, alert, oriented x3, no acute distress, cooperative   Eyes: EOMI, clear sclera   ENMT: mucous membranes moist, no lesions seen   Head/Neck: Neck supple, no apparent injury, head atraumatic   Respiratory/Thorax: CTAB, good chest expansion, respirations even and unlabored   Cardiovascular: Regular rate and rhythm, no murmurs/rubs/gallops, normal S1 and S 2   Gastrointestinal: Abdomen nondistended, soft, nontender, hyperactive BS, no bruits   Musculoskeletal: ROM intact, no joint swelling, normal  strength   Extremities: no cyanosis, edema, contusions or clubbing   Neurological: no focal deficit, pt alert and oriented x3   Psychological: Appropriate affect and behavior   Skin: Warm and dry, no lesions, no rashes       Past Medical History:   Diagnosis Date    Anxiety     Bowel perforation (CMS/HCC)     during colonoscopy    BPH (benign prostatic hyperplasia)     CAD (coronary artery disease)     CKD (chronic kidney disease)     Erectile dysfunction     Gout     HTN (hypertension)     Hypothyroidism     Stroke (cerebrum) (CMS/HCC) 2009        Assessment/Plan  E. coli UTI      - day #3 Rocephin, fevers and leukocytosis resolved  Metabolic encephalopathy     - resolved, no hx dementia, per family pt gets confused with infections  Hx of stroke x2 and TIA x2  CAD s/p CABG x3     - continue Plavix, metoprolol and statin     - pt off ASA and on \"prophylactic\" PPI for hx of GIB, which dtr says was a bowel perf during colonoscopy  Hx CKD      - appears to be at baseline (~1.3-1.4 per EMR), although Scr is uptrending  HTN, uncontrolled     - BP elevated, has been well controlled in the past on current home meds, add PRN IV hydralazine  DVT ppx with subcutaneous heparin  Discharge disposition     - precert pending for Alexander Jose Pryor, CNP  Hospital Medicine    "

## 2024-02-11 LAB
ANION GAP SERPL CALC-SCNC: 14 MMOL/L (ref 10–20)
BUN SERPL-MCNC: 15 MG/DL (ref 6–23)
CALCIUM SERPL-MCNC: 9.8 MG/DL (ref 8.6–10.3)
CHLORIDE SERPL-SCNC: 103 MMOL/L (ref 98–107)
CO2 SERPL-SCNC: 25 MMOL/L (ref 21–32)
CREAT SERPL-MCNC: 1.32 MG/DL (ref 0.5–1.3)
EGFRCR SERPLBLD CKD-EPI 2021: 58 ML/MIN/1.73M*2
GLUCOSE SERPL-MCNC: 83 MG/DL (ref 74–99)
HOLD SPECIMEN: NORMAL
POTASSIUM SERPL-SCNC: 3.8 MMOL/L (ref 3.5–5.3)
SODIUM SERPL-SCNC: 138 MMOL/L (ref 136–145)

## 2024-02-11 PROCEDURE — 36415 COLL VENOUS BLD VENIPUNCTURE: CPT | Performed by: NURSE PRACTITIONER

## 2024-02-11 PROCEDURE — 2500000004 HC RX 250 GENERAL PHARMACY W/ HCPCS (ALT 636 FOR OP/ED): Performed by: INTERNAL MEDICINE

## 2024-02-11 PROCEDURE — 1200000002 HC GENERAL ROOM WITH TELEMETRY DAILY

## 2024-02-11 PROCEDURE — 80048 BASIC METABOLIC PNL TOTAL CA: CPT | Performed by: NURSE PRACTITIONER

## 2024-02-11 PROCEDURE — 2500000004 HC RX 250 GENERAL PHARMACY W/ HCPCS (ALT 636 FOR OP/ED): Performed by: NURSE PRACTITIONER

## 2024-02-11 PROCEDURE — 2500000002 HC RX 250 W HCPCS SELF ADMINISTERED DRUGS (ALT 637 FOR MEDICARE OP, ALT 636 FOR OP/ED): Performed by: NURSE PRACTITIONER

## 2024-02-11 PROCEDURE — 99232 SBSQ HOSP IP/OBS MODERATE 35: CPT | Performed by: INTERNAL MEDICINE

## 2024-02-11 PROCEDURE — 2500000001 HC RX 250 WO HCPCS SELF ADMINISTERED DRUGS (ALT 637 FOR MEDICARE OP): Performed by: NURSE PRACTITIONER

## 2024-02-11 RX ORDER — LOSARTAN POTASSIUM 25 MG/1
25 TABLET ORAL 2 TIMES DAILY
Status: DISCONTINUED | OUTPATIENT
Start: 2024-02-11 | End: 2024-02-13 | Stop reason: HOSPADM

## 2024-02-11 RX ORDER — ALLOPURINOL 100 MG/1
200 TABLET ORAL DAILY
Status: DISCONTINUED | OUTPATIENT
Start: 2024-02-11 | End: 2024-02-13 | Stop reason: HOSPADM

## 2024-02-11 RX ADMIN — SENNOSIDES 17.2 MG: 8.6 TABLET, FILM COATED ORAL at 20:25

## 2024-02-11 RX ADMIN — LEVOTHYROXINE SODIUM 175 MCG: 175 TABLET ORAL at 06:00

## 2024-02-11 RX ADMIN — ACETAMINOPHEN 650 MG: 325 TABLET ORAL at 09:25

## 2024-02-11 RX ADMIN — ROSUVASTATIN CALCIUM 20 MG: 10 TABLET, FILM COATED ORAL at 08:10

## 2024-02-11 RX ADMIN — METOPROLOL SUCCINATE 12.5 MG: 25 TABLET, EXTENDED RELEASE ORAL at 08:10

## 2024-02-11 RX ADMIN — FAMOTIDINE 20 MG: 20 TABLET ORAL at 20:25

## 2024-02-11 RX ADMIN — LOSARTAN POTASSIUM 25 MG: 25 TABLET, FILM COATED ORAL at 20:25

## 2024-02-11 RX ADMIN — CEFTRIAXONE SODIUM 1 G: 1 INJECTION, SOLUTION INTRAVENOUS at 08:10

## 2024-02-11 RX ADMIN — TAMSULOSIN HYDROCHLORIDE 0.4 MG: 0.4 CAPSULE ORAL at 20:25

## 2024-02-11 RX ADMIN — HEPARIN SODIUM 5000 UNITS: 5000 INJECTION INTRAVENOUS; SUBCUTANEOUS at 08:10

## 2024-02-11 RX ADMIN — TAMSULOSIN HYDROCHLORIDE 0.4 MG: 0.4 CAPSULE ORAL at 08:10

## 2024-02-11 RX ADMIN — HYDRALAZINE HYDROCHLORIDE 10 MG: 20 INJECTION INTRAMUSCULAR; INTRAVENOUS at 05:43

## 2024-02-11 RX ADMIN — POLYVINYL ALCOHOL, POVIDONE 1 DROP: 14; 6 SOLUTION/ DROPS OPHTHALMIC at 20:24

## 2024-02-11 RX ADMIN — SENNOSIDES 17.2 MG: 8.6 TABLET, FILM COATED ORAL at 08:10

## 2024-02-11 RX ADMIN — HEPARIN SODIUM 5000 UNITS: 5000 INJECTION INTRAVENOUS; SUBCUTANEOUS at 15:58

## 2024-02-11 RX ADMIN — ALLOPURINOL 200 MG: 100 TABLET ORAL at 11:54

## 2024-02-11 RX ADMIN — PANTOPRAZOLE SODIUM 40 MG: 40 TABLET, DELAYED RELEASE ORAL at 06:00

## 2024-02-11 RX ADMIN — CLOPIDOGREL BISULFATE 75 MG: 75 TABLET ORAL at 08:10

## 2024-02-11 RX ADMIN — FAMOTIDINE 20 MG: 20 TABLET ORAL at 08:10

## 2024-02-11 RX ADMIN — CITALOPRAM HYDROBROMIDE 40 MG: 20 TABLET ORAL at 08:10

## 2024-02-11 RX ADMIN — ACETAMINOPHEN 650 MG: 325 TABLET ORAL at 20:24

## 2024-02-11 ASSESSMENT — PAIN - FUNCTIONAL ASSESSMENT
PAIN_FUNCTIONAL_ASSESSMENT: 0-10

## 2024-02-11 ASSESSMENT — COGNITIVE AND FUNCTIONAL STATUS - GENERAL
DAILY ACTIVITIY SCORE: 15
TURNING FROM BACK TO SIDE WHILE IN FLAT BAD: A LOT
STANDING UP FROM CHAIR USING ARMS: A LOT
WALKING IN HOSPITAL ROOM: A LOT
PERSONAL GROOMING: A LITTLE
DRESSING REGULAR LOWER BODY CLOTHING: A LOT
MOBILITY SCORE: 13
CLIMB 3 TO 5 STEPS WITH RAILING: TOTAL
HELP NEEDED FOR BATHING: A LOT
TOILETING: A LOT
MOVING TO AND FROM BED TO CHAIR: A LOT
DRESSING REGULAR UPPER BODY CLOTHING: A LOT

## 2024-02-11 ASSESSMENT — PAIN DESCRIPTION - LOCATION
LOCATION: HEAD
LOCATION: HEAD

## 2024-02-11 ASSESSMENT — PAIN SCALES - GENERAL
PAINLEVEL_OUTOF10: 3
PAINLEVEL_OUTOF10: 7
PAINLEVEL_OUTOF10: 0 - NO PAIN
PAINLEVEL_OUTOF10: 3

## 2024-02-11 NOTE — CARE PLAN
The patient's goals for the shift include      The clinical goals for the shift include patient will remain safe throughout the shift  Not to fall.

## 2024-02-11 NOTE — PROGRESS NOTES
Subjective  Pt OOB in chair. He is in good spirits and joking. He says he's having  gout flare of his L knee (lateral aspect) that started this morning. No SOB or nausea.    Objective  I personally reviewed all pertinent labwork and imaging, as well as nursing, therapy, discharge planning and consult notes.   Vitals:    02/11/24 0802   BP: (!) 186/104   Pulse: 66   Resp: 18   Temp: 37 °C (98.6 °F)   SpO2: 98%       Vitals:    02/07/24 1416   Weight: 72.6 kg (160 lb)       Scheduled medications  cefTRIAXone, 1 g, intravenous, q24h  citalopram, 40 mg, oral, Daily  clopidogrel, 75 mg, oral, Daily  famotidine, 20 mg, oral, BID  heparin (porcine), 5,000 Units, subcutaneous, q8h  levothyroxine, 175 mcg, oral, Daily before breakfast  losartan, 25 mg, oral, Nightly  metoprolol succinate XL, 12.5 mg, oral, Daily  pantoprazole, 40 mg, oral, Daily  rosuvastatin, 20 mg, oral, Daily  sennosides, 2 tablet, oral, BID  tamsulosin, 0.4 mg, oral, BID      Continuous medications     PRN medications  PRN medications: acetaminophen, hydrALAZINE, lubricating eye drops    Results for orders placed or performed during the hospital encounter of 02/07/24 (from the past 24 hour(s))   Basic Metabolic Panel   Result Value Ref Range    Glucose 83 74 - 99 mg/dL    Sodium 138 136 - 145 mmol/L    Potassium 3.8 3.5 - 5.3 mmol/L    Chloride 103 98 - 107 mmol/L    Bicarbonate 25 21 - 32 mmol/L    Anion Gap 14 10 - 20 mmol/L    Urea Nitrogen 15 6 - 23 mg/dL    Creatinine 1.32 (H) 0.50 - 1.30 mg/dL    eGFR 58 (L) >60 mL/min/1.73m*2    Calcium 9.8 8.6 - 10.3 mg/dL   Lavender Top   Result Value Ref Range    Extra Tube Hold for add-ons.        Constitutional: Well developed, awake, alert, oriented x3, no acute distress, cooperative   Eyes: EOMI, clear sclera   ENMT: mucous membranes moist, no lesions seen   Head/Neck: Neck supple, no apparent injury, head atraumatic   Respiratory/Thorax: CTAB, good chest expansion, respirations even and unlabored  "  Cardiovascular: Regular rate and rhythm, no murmurs/rubs/gallops, normal S1 and S 2   Gastrointestinal: Abdomen nondistended, soft, nontender, hyperactive BS, no bruits   Musculoskeletal: ROM intact, no joint swelling, normal  strength   Extremities: no cyanosis, edema, contusions or clubbing, left lateral knee tender, no erythema/warmth/edema noted   Neurological: no focal deficit, pt alert and oriented x3   Psychological: Appropriate affect and behavior   Skin: Warm and dry, no lesions, no rashes       Past Medical History:   Diagnosis Date    Anxiety     Bowel perforation (CMS/HCC)     during colonoscopy    BPH (benign prostatic hyperplasia)     CAD (coronary artery disease)     CKD (chronic kidney disease)     Erectile dysfunction     Gout     HTN (hypertension)     Hypothyroidism     Stroke (cerebrum) (CMS/HCC) 2009        Assessment/Plan  E. coli UTI      - day #4 Rocephin, fevers and leukocytosis resolved  Hx of stroke x2 and TIA x2  CAD s/p CABG x3     - continue Plavix, metoprolol and statin     - pt off ASA and on \"prophylactic\" PPI for hx of GIB, which dtr says was a bowel perf during colonoscopy  Hx CKD      - appears to be at baseline (~1.3-1.4 per EMR)  HTN, uncontrolled     - BP elevated, has been well controlled in the past on current home meds, continue PRN IV hydralazine, will increase losartan  Gout flare     - resume home allopurinol, consider additional tx if pain persists  DVT ppx with subcutaneous heparin  Discharge disposition     - precert pending for Ann Pryor CNP  Hospital Medicine    "

## 2024-02-12 PROCEDURE — 1100000001 HC PRIVATE ROOM DAILY

## 2024-02-12 PROCEDURE — 2500000001 HC RX 250 WO HCPCS SELF ADMINISTERED DRUGS (ALT 637 FOR MEDICARE OP): Performed by: NURSE PRACTITIONER

## 2024-02-12 PROCEDURE — 97110 THERAPEUTIC EXERCISES: CPT | Mod: GO

## 2024-02-12 PROCEDURE — 2500000004 HC RX 250 GENERAL PHARMACY W/ HCPCS (ALT 636 FOR OP/ED): Performed by: NURSE PRACTITIONER

## 2024-02-12 PROCEDURE — 2500000002 HC RX 250 W HCPCS SELF ADMINISTERED DRUGS (ALT 637 FOR MEDICARE OP, ALT 636 FOR OP/ED): Performed by: NURSE PRACTITIONER

## 2024-02-12 PROCEDURE — 97116 GAIT TRAINING THERAPY: CPT | Mod: GP,CQ

## 2024-02-12 RX ORDER — COLCHICINE 0.6 MG/1
0.6 TABLET ORAL ONCE
Status: COMPLETED | OUTPATIENT
Start: 2024-02-12 | End: 2024-02-12

## 2024-02-12 RX ORDER — LOSARTAN POTASSIUM 25 MG/1
25 TABLET ORAL 2 TIMES DAILY
Start: 2024-02-12

## 2024-02-12 RX ORDER — SENNOSIDES 8.6 MG/1
2 TABLET ORAL 2 TIMES DAILY
Start: 2024-02-12

## 2024-02-12 RX ORDER — COLCHICINE 0.6 MG/1
1.2 TABLET ORAL ONCE
Status: COMPLETED | OUTPATIENT
Start: 2024-02-12 | End: 2024-02-12

## 2024-02-12 RX ADMIN — TAMSULOSIN HYDROCHLORIDE 0.4 MG: 0.4 CAPSULE ORAL at 21:44

## 2024-02-12 RX ADMIN — HEPARIN SODIUM 5000 UNITS: 5000 INJECTION INTRAVENOUS; SUBCUTANEOUS at 08:59

## 2024-02-12 RX ADMIN — TAMSULOSIN HYDROCHLORIDE 0.4 MG: 0.4 CAPSULE ORAL at 09:53

## 2024-02-12 RX ADMIN — HEPARIN SODIUM 5000 UNITS: 5000 INJECTION INTRAVENOUS; SUBCUTANEOUS at 00:10

## 2024-02-12 RX ADMIN — ALLOPURINOL 200 MG: 100 TABLET ORAL at 09:53

## 2024-02-12 RX ADMIN — ACETAMINOPHEN 650 MG: 325 TABLET ORAL at 16:24

## 2024-02-12 RX ADMIN — CLOPIDOGREL BISULFATE 75 MG: 75 TABLET ORAL at 09:53

## 2024-02-12 RX ADMIN — CEFTRIAXONE SODIUM 1 G: 1 INJECTION, SOLUTION INTRAVENOUS at 06:12

## 2024-02-12 RX ADMIN — FAMOTIDINE 20 MG: 20 TABLET ORAL at 09:53

## 2024-02-12 RX ADMIN — FAMOTIDINE 20 MG: 20 TABLET ORAL at 21:44

## 2024-02-12 RX ADMIN — ROSUVASTATIN CALCIUM 20 MG: 10 TABLET, FILM COATED ORAL at 09:53

## 2024-02-12 RX ADMIN — PANTOPRAZOLE SODIUM 40 MG: 40 TABLET, DELAYED RELEASE ORAL at 06:12

## 2024-02-12 RX ADMIN — LOSARTAN POTASSIUM 25 MG: 25 TABLET, FILM COATED ORAL at 09:54

## 2024-02-12 RX ADMIN — HEPARIN SODIUM 5000 UNITS: 5000 INJECTION INTRAVENOUS; SUBCUTANEOUS at 16:27

## 2024-02-12 RX ADMIN — METOPROLOL SUCCINATE 12.5 MG: 25 TABLET, EXTENDED RELEASE ORAL at 09:54

## 2024-02-12 RX ADMIN — LEVOTHYROXINE SODIUM 175 MCG: 175 TABLET ORAL at 06:12

## 2024-02-12 RX ADMIN — SENNOSIDES 17.2 MG: 8.6 TABLET, FILM COATED ORAL at 21:44

## 2024-02-12 RX ADMIN — COLCHICINE 0.6 MG: 0.6 TABLET, FILM COATED ORAL at 13:00

## 2024-02-12 RX ADMIN — CITALOPRAM HYDROBROMIDE 40 MG: 20 TABLET ORAL at 09:53

## 2024-02-12 RX ADMIN — SENNOSIDES 17.2 MG: 8.6 TABLET, FILM COATED ORAL at 09:54

## 2024-02-12 RX ADMIN — COLCHICINE 1.2 MG: 0.6 TABLET, FILM COATED ORAL at 11:45

## 2024-02-12 RX ADMIN — LOSARTAN POTASSIUM 25 MG: 25 TABLET, FILM COATED ORAL at 21:44

## 2024-02-12 ASSESSMENT — COGNITIVE AND FUNCTIONAL STATUS - GENERAL
DAILY ACTIVITIY SCORE: 16
TOILETING: A LOT
DRESSING REGULAR UPPER BODY CLOTHING: A LITTLE
MOVING FROM LYING ON BACK TO SITTING ON SIDE OF FLAT BED WITH BEDRAILS: A LITTLE
HELP NEEDED FOR BATHING: A LOT
STANDING UP FROM CHAIR USING ARMS: A LOT
WALKING IN HOSPITAL ROOM: A LOT
TURNING FROM BACK TO SIDE WHILE IN FLAT BAD: A LITTLE
MOVING TO AND FROM BED TO CHAIR: A LOT
DRESSING REGULAR LOWER BODY CLOTHING: A LOT
MOBILITY SCORE: 13
PERSONAL GROOMING: A LITTLE
CLIMB 3 TO 5 STEPS WITH RAILING: TOTAL

## 2024-02-12 ASSESSMENT — PAIN - FUNCTIONAL ASSESSMENT
PAIN_FUNCTIONAL_ASSESSMENT: 0-10

## 2024-02-12 ASSESSMENT — PAIN SCALES - GENERAL
PAINLEVEL_OUTOF10: 0 - NO PAIN

## 2024-02-12 NOTE — CARE PLAN
Problem: Pain  Goal: My pain/discomfort is manageable  Outcome: Progressing     Problem: Safety  Goal: Patient will be injury free during hospitalization  Outcome: Progressing  Goal: I will remain free of falls  Outcome: Progressing     Problem: Daily Care  Goal: Daily care needs are met  Outcome: Progressing     Problem: Psychosocial Needs  Goal: Demonstrates ability to cope with hospitalization/illness  Outcome: Progressing  Goal: Collaborate with me, my family, and caregiver to identify my specific goals  Outcome: Progressing     Problem: Discharge Barriers  Goal: My discharge needs are met  Outcome: Progressing     Problem: Skin  Goal: Participates in plan/prevention/treatment measures  Outcome: Progressing  Flowsheets (Taken 2/12/2024 1219)  Participates in plan/prevention/treatment measures: Increase activity/out of bed for meals  Goal: Prevent/manage excess moisture  Outcome: Progressing  Goal: Prevent/minimize sheer/friction injuries  Outcome: Progressing  Goal: Promote/optimize nutrition  Outcome: Progressing  Goal: Promote skin healing  Outcome: Progressing   The patient's goals for the shift include      The clinical goals for the shift include Patient will reamain safe and free from injury for entire shift.    Over the shift, the patient did not make progress toward the following goals. Barriers to progression include Dx. Recommendations to address these barriers include follow POC.

## 2024-02-12 NOTE — PROGRESS NOTES
Physical Therapy    Physical Therapy Treatment    Patient Name: Aldo Rayo  MRN: [id number]  Today's Date: 2/12/2024  Time Calculation  Start Time: 1102  Stop Time: 1125  Time Calculation (min): 23 min       Assessment/Plan   PT Assessment  End of Session Patient Position: Up in chair, Alarm on     PT Plan  Treatment/Interventions: Bed mobility, Transfer training, Gait training, Strengthening  PT Plan: Skilled PT  PT Frequency: 3 times per week  PT Discharge Recommendations: Moderate intensity level of continued care  PT - OK to Discharge: Yes      General Visit Information:   PT  Visit  PT Received On: 02/12/24  General  Co-Treatment: co-tx with OT required due to pt's decreased activity tolerance, ensuring safe therapeutic tx to facilitate maximum participation with skilled intervention.  Patient Position Received: Bed, 2 rail up  General Comment:  (pt pleasant; agreeable to participate in therapy session)    Subjective   Precautions:  Precautions  Medical Precautions: Fall precautions       Objective   Pain:  Pain Assessment  Pain Assessment:  (pt c/o bilat knee and ankle pain (L>R), does not quantify)     Treatments:  Therapeutic Exercise  Therapeutic Exercise Performed:  (seated BLE AP, LAQ, and marching x 10 ea.  much cuing required for technique.)    Bed Mobility  Bed Mobility:  (sup > sit with SBA using bedrail to assist.  increased time needed to complete and scoot fully to EOB)    Ambulation/Gait Training  Ambulation/Gait Training Performed:  (pt performed static stance followed by ambulating 12 ft around bed to recliner chair, FWW and min A x 2 plus chair follow for safety.  very slow pace with several standing rest breaks.  VC for safe walker positioning and BLE advancement.  decreased step/stride lengths; decreased toe to floor clearance. )    Transfers  Transfer:  (sit <> stand with FWW and mod A x 1.  VC for safe hand placement and sequencing.  pt unsteady and somewhat tremulous upon initial  stance.)    Outcome Measures:  [facility] Basic Mobility  Turning from your back to your side while in a flat bed without using bedrails: A little  Moving from lying on your back to sitting on the side of a flat bed without using bedrails: A little  Moving to and from bed to chair (including a wheelchair): A lot  Standing up from a chair using your arms (e.g. wheelchair or bedside chair): A lot  To walk in hospital room: A lot  Climbing 3-5 steps with railing: Total  Basic Mobility - Total Score: 13    Education Documentation  Mobility Training, taught by Ciara Jennings PTA at 2/12/2024  2:50 PM.  Learner: Patient  Readiness: Acceptance  Method: Explanation  Response: Verbalizes Understanding, Needs Reinforcement    Education Comments  No comments found.        EDUCATION:       Encounter Problems       Encounter Problems (Active)       PT Problem       PT Goal 1 (Progressing)       Start:  02/07/24    Expected End:  02/21/24       Indep bed mob          PT Goal 2 (Progressing)       Start:  02/07/24    Expected End:  02/21/24       Sba txs          PT Goal 3 (Progressing)       Start:  02/07/24    Expected End:  02/21/24       Sba amb w/ lad 15oft x4         PT Goal 4 (Progressing)       Start:  02/07/24    Expected End:  02/21/24       20-30 reps ble there ex

## 2024-02-12 NOTE — PROGRESS NOTES
Occupational Therapy    OT Treatment    Patient Name: Aldo Rayo  MRN: [id number]  Today's Date: 2/12/2024  Time Calculation  Start Time: 1101  Stop Time: 1125  Time Calculation (min): 24 min         Plan:  Treatment Interventions: ADL retraining, Functional transfer training, UE strengthening/ROM, Endurance training, Compensatory technique education  OT Frequency: 3 times per week  OT Discharge Recommendations: Moderate intensity level of continued care  OT - OK to Discharge: Yes (to next level of care when cleared by medical team)  Treatment Interventions: ADL retraining, Functional transfer training, UE strengthening/ROM, Endurance training, Compensatory technique education    Subjective     General:  General  Co-Treatment: PT  Co-Treatment Reason: to maximize pt safety  Patient Position Received:  (in bed at start of treatment, but agreeable to sitting up in chair.)  General Comment:  (pt reported that he has tried to get up on his own.)  Precautions:     Pain:  Pain Assessment  Pain Assessment: 0-10    Objective    Cognition:  Cognition  Safety/Judgement: Exceptions to WFL  Coordination:     Activities of Daily Living:      Functional Standing Tolerance:  Functional Standing Tolerance Comments: pt able to tolerate static stand up to walkerx 1 minute. with sba  Bed Mobility/Transfers: Bed Mobility  Bed Mobility:  (supine to sit sba with use of bed rail)    Transfers  Transfer:  (sit to stand with mod assist from bed surface)       Ambulation/Gait Training:  Ambulation/Gait Training  Ambulation/Gait Training Performed:  (ambulation with wheeled walker with min assist x 2 from bed to chair x 12 feet., pt demonstrated shuffled gait, delayed gait, effortful steps, cues and assist with walker manovering to initiate next steps.)       Therapy/Activity: Therapeutic Exercise  Therapeutic Exercise Performed:  (pt able to complete bue ex without wt in seated postion, 2 sets 3 sets for increased postural expansion in  shoulder, neck and chest.)         Outcome Measures:[facility] Daily Activity  Putting on and taking off regular lower body clothing: A lot  Bathing (including washing, rinsing, drying): A lot  Putting on and taking off regular upper body clothing: A little  Toileting, which includes using toilet, bedpan or urinal: A lot  Taking care of personal grooming such as brushing teeth: A little  Eating Meals: None  Daily Activity - Total Score: 16        Education Documentation  Body Mechanics, taught by Cindy Subramanian OT at 2/12/2024 12:57 PM.  Learner: Patient  Readiness: Acceptance  Method: Explanation  Response: Verbalizes Understanding    ADL Training, taught by Cindy Subramanian OT at 2/12/2024 12:57 PM.  Learner: Patient  Readiness: Acceptance  Method: Explanation  Response: Verbalizes Understanding    Education Comments  No comments found.      Goals:  Encounter Problems       Encounter Problems (Active)       OT Goals       Increase functional mobility and  functional transfers to cga for bed/chair/toilet with dme prn   (Progressing)       Start:  02/08/24    Expected End:  02/22/24            increase bue ther ex/activity x 7-10 minutes and increase standing tolerance with cga x 3-5 minutes to promote greater activity tolerance for assist with adl.   (Progressing)       Start:  02/08/24    Expected End:  02/22/24            Increase ub/lb dressing to cga with dme prn  (Progressing)       Start:  02/08/24    Expected End:  02/22/24            Increase ub/lb bathing to cga with dme prn  (Progressing)       Start:  02/08/24    Expected End:  02/22/24            Increase toileting to cga with dme prn  (Progressing)       Start:  02/08/24    Expected End:  02/22/24

## 2024-02-12 NOTE — DISCHARGE SUMMARY
"Hospital Course  Aldo Rayo is a 68 y.o. male who presented with increased confusion and urinary frequency.  Per family whenever he gets sick he has \"stroke-like symptoms\" (confusion). He also had urinary frequency and urgency, as well as falls. ED workup was notable for fevers and leukocytosis, UA was positive for UTI. He is on day #5 of Rocephin for E. coli UTI; fevers and leukocytosis have resolved. Chronic CKD has been at baseline (~1.3-1.4 per EMR). His HTN has been uncontrolled and home losartan was increased. He will need a followup BMP in a week. He was treated for gout flare with colchicine after home allopurinol was resumed. He is medically stable to discharge to [facility] pending precert.     Subjective  Pt OOB in chair. His gout pain is persistent in his L knee, today says he also has it in his ankles. He's had gout in these areas before, says he requested colchicine last night.     Objective    Vitals:    02/12/24 0954   BP: 144/88   Pulse: 88   Resp:    Temp:    SpO2:        Vitals:    02/07/24 1416   Weight: 72.6 kg (160 lb)       No results found for this or any previous visit (from the past 24 hour(s)).    Constitutional: Well developed, awake, alert, oriented x3, no acute distress, cooperative   Eyes: EOMI, clear sclera   ENMT: mucous membranes moist, no lesions seen   Head/Neck: Neck supple, no apparent injury, head atraumatic   Respiratory/Thorax: CTAB, good chest expansion, respirations even and unlabored   Cardiovascular: Regular rate and rhythm, no murmurs/rubs/gallops, normal S1 and S 2   Gastrointestinal: Abdomen nondistended, soft, nontender, hyperactive BS, no bruits   Musculoskeletal: ROM intact, no joint swelling, normal  strength   Extremities: no cyanosis, edema, contusions or clubbing, left lateral knee and bilateral bilateral malleoli are tender, no erythema/warmth/edema noted   Neurological: no focal deficit, pt alert and oriented x3   Psychological: Appropriate affect " and behavior   Skin: Warm and dry, no lesions, no rashes       Past Medical History:   Diagnosis Date    Anxiety     Bowel perforation (CMS/HCC)     during colonoscopy    BPH (benign prostatic hyperplasia)     CAD (coronary artery disease)     CKD (chronic kidney disease)     Erectile dysfunction     Gout     HTN (hypertension)     Hypothyroidism     Stroke (cerebrum) (CMS/HCC) 2009           Your medication list        START taking these medications        Instructions Last Dose Given Next Dose Due   lubricating eye drops ophthalmic solution      Administer 1 drop into both eyes if needed for dry eyes.       sennosides 8.6 mg tablet  Commonly known as: Senokot      Take 2 tablets (17.2 mg) by mouth 2 times a day.              CHANGE how you take these medications        Instructions Last Dose Given Next Dose Due   losartan 25 mg tablet  Commonly known as: Cozaar  What changed: when to take this      Take 1 tablet (25 mg) by mouth 2 times a day.              CONTINUE taking these medications        Instructions Last Dose Given Next Dose Due   allopurinol 100 mg tablet  Commonly known as: Zyloprim           citalopram 40 mg tablet  Commonly known as: CeleXA           clopidogrel 75 mg tablet  Commonly known as: Plavix           famotidine 20 mg tablet  Commonly known as: Pepcid           fluticasone 50 mcg/actuation nasal spray  Commonly known as: Flonase           levothyroxine 175 mcg tablet  Commonly known as: Synthroid, Levoxyl           metoprolol succinate XL 25 mg 24 hr tablet  Commonly known as: Toprol-XL           nitroglycerin 0.4 mg SL tablet  Commonly known as: Nitrostat           pantoprazole 40 mg EC tablet  Commonly known as: ProtoNix           rosuvastatin 20 mg tablet  Commonly known as: Crestor           tamsulosin 0.4 mg 24 hr capsule  Commonly known as: Flomax                     Where to Get Your Medications        Information about where to get these medications is not yet available    Ask your  nurse or doctor about these medications  losartan 25 mg tablet  lubricating eye drops ophthalmic solution  sennosides 8.6 mg tablet         No future appointments.      Francheska Pryor, CNP  Hospital Medicine

## 2024-02-12 NOTE — CARE PLAN
The patient's goals for the shift include getting better from his UTI.     The clinical goals for the shift include patient will remain safe throughout the shift

## 2024-02-13 VITALS
TEMPERATURE: 98.4 F | HEART RATE: 83 BPM | OXYGEN SATURATION: 93 % | HEIGHT: 67 IN | BODY MASS INDEX: 25.11 KG/M2 | WEIGHT: 160 LBS | DIASTOLIC BLOOD PRESSURE: 90 MMHG | SYSTOLIC BLOOD PRESSURE: 142 MMHG | RESPIRATION RATE: 18 BRPM

## 2024-02-13 LAB — BACTERIA BLD CULT: NORMAL

## 2024-02-13 PROCEDURE — 2500000004 HC RX 250 GENERAL PHARMACY W/ HCPCS (ALT 636 FOR OP/ED): Performed by: NURSE PRACTITIONER

## 2024-02-13 PROCEDURE — 99232 SBSQ HOSP IP/OBS MODERATE 35: CPT | Performed by: INTERNAL MEDICINE

## 2024-02-13 PROCEDURE — 2500000001 HC RX 250 WO HCPCS SELF ADMINISTERED DRUGS (ALT 637 FOR MEDICARE OP): Performed by: NURSE PRACTITIONER

## 2024-02-13 PROCEDURE — 2500000002 HC RX 250 W HCPCS SELF ADMINISTERED DRUGS (ALT 637 FOR MEDICARE OP, ALT 636 FOR OP/ED): Performed by: NURSE PRACTITIONER

## 2024-02-13 RX ADMIN — ROSUVASTATIN CALCIUM 20 MG: 10 TABLET, FILM COATED ORAL at 08:42

## 2024-02-13 RX ADMIN — FAMOTIDINE 20 MG: 20 TABLET ORAL at 08:41

## 2024-02-13 RX ADMIN — HEPARIN SODIUM 5000 UNITS: 5000 INJECTION INTRAVENOUS; SUBCUTANEOUS at 15:02

## 2024-02-13 RX ADMIN — CLOPIDOGREL BISULFATE 75 MG: 75 TABLET ORAL at 08:40

## 2024-02-13 RX ADMIN — HEPARIN SODIUM 5000 UNITS: 5000 INJECTION INTRAVENOUS; SUBCUTANEOUS at 00:46

## 2024-02-13 RX ADMIN — ACETAMINOPHEN 650 MG: 325 TABLET ORAL at 10:18

## 2024-02-13 RX ADMIN — LEVOTHYROXINE SODIUM 175 MCG: 175 TABLET ORAL at 06:44

## 2024-02-13 RX ADMIN — TAMSULOSIN HYDROCHLORIDE 0.4 MG: 0.4 CAPSULE ORAL at 08:41

## 2024-02-13 RX ADMIN — CEFTRIAXONE SODIUM 1 G: 1 INJECTION, SOLUTION INTRAVENOUS at 06:44

## 2024-02-13 RX ADMIN — ALLOPURINOL 200 MG: 100 TABLET ORAL at 08:42

## 2024-02-13 RX ADMIN — CITALOPRAM HYDROBROMIDE 40 MG: 20 TABLET ORAL at 08:41

## 2024-02-13 RX ADMIN — METOPROLOL SUCCINATE 12.5 MG: 25 TABLET, EXTENDED RELEASE ORAL at 08:40

## 2024-02-13 RX ADMIN — HYDRALAZINE HYDROCHLORIDE 10 MG: 20 INJECTION INTRAMUSCULAR; INTRAVENOUS at 04:42

## 2024-02-13 RX ADMIN — PANTOPRAZOLE SODIUM 40 MG: 40 TABLET, DELAYED RELEASE ORAL at 06:44

## 2024-02-13 RX ADMIN — HEPARIN SODIUM 5000 UNITS: 5000 INJECTION INTRAVENOUS; SUBCUTANEOUS at 08:43

## 2024-02-13 RX ADMIN — LOSARTAN POTASSIUM 25 MG: 25 TABLET, FILM COATED ORAL at 08:42

## 2024-02-13 ASSESSMENT — PAIN SCALES - GENERAL
PAINLEVEL_OUTOF10: 5 - MODERATE PAIN
PAINLEVEL_OUTOF10: 2

## 2024-02-13 ASSESSMENT — PAIN - FUNCTIONAL ASSESSMENT: PAIN_FUNCTIONAL_ASSESSMENT: 0-10

## 2024-02-13 NOTE — PROGRESS NOTES
"Aldo Rayo is a 68 y.o. male on day 4 of admission presenting with UTI (urinary tract infection).        Subjective   No events overnight reported by nursing staff.   Discharge delay for precert/DC Planning.        Objective     Last Recorded Vitals  /90 (BP Location: Right arm, Patient Position: Lying)   Pulse 83   Temp 36.9 °C (98.4 °F) (Temporal)   Resp 18   Ht 1.702 m (5' 7\")   Wt 72.6 kg (160 lb)   SpO2 93%   BMI 25.06 kg/m²     Intake/Output last 3 Shifts:  I/O last 3 completed shifts:  In: - (0 mL/kg)   Out: 950 (13.1 mL/kg) [Urine:950 (0.4 mL/kg/hr)]  Weight: 72.6 kg       =========RELEVANT RESULTS ==========  Labs  Lab Results   Component Value Date    WBC 6.6 02/10/2024    HGB 14.6 02/10/2024    HCT 44.4 02/10/2024    MCV 97 02/10/2024     (L) 02/10/2024     Lab Results   Component Value Date    GLUCOSE 83 02/11/2024    CALCIUM 9.8 02/11/2024     02/11/2024    K 3.8 02/11/2024    CO2 25 02/11/2024     02/11/2024    BUN 15 02/11/2024    CREATININE 1.32 (H) 02/11/2024      Lab Results   Component Value Date    ALT 13 02/07/2024    AST 31 02/07/2024    ALKPHOS 50 02/07/2024    BILITOT 1.4 (H) 02/07/2024        Recent Echocardiogram (14D):   No echocardiogram results found for the past 14 days    TTE 12 month if available:  No echocardiogram results found for the past 12 months    Recent Imaging Results:  ECG 12 lead  Normal sinus rhythm  Incomplete right bundle branch block  Left anterior fascicular block  Minimal voltage criteria for LVH, may be normal variant ( R in aVL )  Possible Lateral infarct , age undetermined  Abnormal ECG  No previous ECGs available      Exam     GENERAL:   no distress, alert and cooperative  HEENT: Normal Inspection, Mucous membranes moist, No JVD, No Lymphadenopathy  CARDIOVASCULAR: RRR, no murmurs, 2+ equal pulses of the extremities, normal S1 and S 2  RESPIRATORY: Patent airways, CTAB, thorax symmetric, No significant wheezing, Rales or " Rhonchi  ABDOMEN: Soft, Non-Tender, Normal Bowel Sounds, No Distention  SKIN: Warm and dry, no lesions, no rashes  EXTREMITIES: normal extremities, no significant cyanosis edema, contusions or wounds, no obsvious clubbing  NEURO: A&O x 3, CN II-XII grossly intact  PSYCH: Appropriate mood and behavior    Additional Physical Exam Notes/Findings        ======= SCHEDULED MEDICATIONS =======  Scheduled medications   Medication Dose Route Frequency    allopurinol  200 mg oral Daily    cefTRIAXone  1 g intravenous q24h    citalopram  40 mg oral Daily    clopidogrel  75 mg oral Daily    famotidine  20 mg oral BID    heparin (porcine)  5,000 Units subcutaneous q8h    levothyroxine  175 mcg oral Daily before breakfast    losartan  25 mg oral BID    metoprolol succinate XL  12.5 mg oral Daily    pantoprazole  40 mg oral Daily    rosuvastatin  20 mg oral Daily    sennosides  2 tablet oral BID    tamsulosin  0.4 mg oral BID       ========== PRN MEDICATIONS =========  acetaminophen, 650 mg, q6h PRN  hydrALAZINE, 10 mg, q6h PRN  lubricating eye drops, 1 drop, PRN        ==============  DIET  ==============  Dietary Orders (From admission, onward)       Start     Ordered    02/07/24 1420  Adult diet Regular  Diet effective now        Question:  Diet type  Answer:  Regular    02/07/24 1419                    ====== Assessment/Plan   =======    ASSESSMENT:  Principal Problem:    UTI (urinary tract infection)  Active Problems:    Acute cystitis with hematuria    ___________________________________________________    UTI   H/o CVA  H/o CAD  H/o CKD  HTN    PLAN:  BP elevated at times.    Labs at baseline.   Rocephin continued until DC.  Full course completed  Cont cozaar, metoprolol      DVT Prophylaxis  subQ heparin    SUMMARY/DISPOSITION  Stable overall.  DC delayed for precert.                Duarte Jordan DO

## 2024-02-13 NOTE — CARE PLAN
The patient's goals for the shift include  to discharge    The clinical goals for the shift include comfort

## 2024-02-13 NOTE — CARE PLAN
The patient's goals for the shift include  comfort    The clinical goals for the shift include rest and comfort

## 2024-02-13 NOTE — PROGRESS NOTES
This SW messaged [facility] in regards to single case agreement for SNF. SW will await response and continue to follow.  UPDATE 1:00p  SW received update that [facility] received single case agreement. SW asked bedside nurse about transport specs for [facility]. SW called dtr Angie to confirm dc for today, dtr agreeable. SW will continue to follow.  UPDATE 1:10p  SW asked [facility] to arrange transport via WC and no O2 needs per nurse recommendations. SW will continue to follow.  UPDATE 2:03p  [facility] Van you requested for Aldo LACEY in unit/room [place] [address] on 02/13/2024 is scheduled to arrive at 4:00pm EST! [facility] is handling this ride and you can contact them at [phone] to [facility] sent/AVS. [phone] in hens. waiting on rn report # [phone] please ask for nurse on OSORIO Leal